# Patient Record
Sex: FEMALE | Race: WHITE | URBAN - METROPOLITAN AREA
[De-identification: names, ages, dates, MRNs, and addresses within clinical notes are randomized per-mention and may not be internally consistent; named-entity substitution may affect disease eponyms.]

---

## 2023-02-06 ENCOUNTER — EMERGENCY (EMERGENCY)
Facility: HOSPITAL | Age: 58
LOS: 0 days | Discharge: HOME | End: 2023-02-06
Attending: EMERGENCY MEDICINE
Payer: COMMERCIAL

## 2023-02-06 VITALS
SYSTOLIC BLOOD PRESSURE: 115 MMHG | HEART RATE: 72 BPM | TEMPERATURE: 98 F | DIASTOLIC BLOOD PRESSURE: 55 MMHG | RESPIRATION RATE: 18 BRPM | OXYGEN SATURATION: 98 %

## 2023-02-06 VITALS
TEMPERATURE: 99 F | SYSTOLIC BLOOD PRESSURE: 96 MMHG | OXYGEN SATURATION: 100 % | HEART RATE: 96 BPM | DIASTOLIC BLOOD PRESSURE: 83 MMHG | RESPIRATION RATE: 16 BRPM

## 2023-02-06 DIAGNOSIS — E78.5 HYPERLIPIDEMIA, UNSPECIFIED: ICD-10-CM

## 2023-02-06 DIAGNOSIS — R29.703 NIHSS SCORE 3: ICD-10-CM

## 2023-02-06 DIAGNOSIS — I10 ESSENTIAL (PRIMARY) HYPERTENSION: ICD-10-CM

## 2023-02-06 DIAGNOSIS — R07.89 OTHER CHEST PAIN: ICD-10-CM

## 2023-02-06 DIAGNOSIS — R20.2 PARESTHESIA OF SKIN: ICD-10-CM

## 2023-02-06 LAB
ALBUMIN SERPL ELPH-MCNC: 3.6 G/DL — SIGNIFICANT CHANGE UP (ref 3.3–5)
ALP SERPL-CCNC: 96 U/L — SIGNIFICANT CHANGE UP (ref 40–120)
ALT FLD-CCNC: 24 U/L — SIGNIFICANT CHANGE UP (ref 12–78)
ANION GAP SERPL CALC-SCNC: 4 MMOL/L — LOW (ref 5–17)
APTT BLD: 26.9 SEC — LOW (ref 27.5–35.5)
AST SERPL-CCNC: 18 U/L — SIGNIFICANT CHANGE UP (ref 15–37)
BASOPHILS # BLD AUTO: 0.05 K/UL — SIGNIFICANT CHANGE UP (ref 0–0.2)
BASOPHILS NFR BLD AUTO: 0.5 % — SIGNIFICANT CHANGE UP (ref 0–2)
BILIRUB SERPL-MCNC: 0.1 MG/DL — LOW (ref 0.2–1.2)
BUN SERPL-MCNC: 17 MG/DL — SIGNIFICANT CHANGE UP (ref 7–23)
CALCIUM SERPL-MCNC: 9.2 MG/DL — SIGNIFICANT CHANGE UP (ref 8.5–10.1)
CHLORIDE SERPL-SCNC: 103 MMOL/L — SIGNIFICANT CHANGE UP (ref 96–108)
CO2 SERPL-SCNC: 33 MMOL/L — HIGH (ref 22–31)
CREAT SERPL-MCNC: 0.69 MG/DL — SIGNIFICANT CHANGE UP (ref 0.5–1.3)
D DIMER BLD IA.RAPID-MCNC: <150 NG/ML DDU — SIGNIFICANT CHANGE UP
EGFR: 102 ML/MIN/1.73M2 — SIGNIFICANT CHANGE UP
EOSINOPHIL # BLD AUTO: 0.19 K/UL — SIGNIFICANT CHANGE UP (ref 0–0.5)
EOSINOPHIL NFR BLD AUTO: 1.8 % — SIGNIFICANT CHANGE UP (ref 0–6)
GLUCOSE SERPL-MCNC: 109 MG/DL — HIGH (ref 70–99)
HCT VFR BLD CALC: 40.6 % — SIGNIFICANT CHANGE UP (ref 34.5–45)
HGB BLD-MCNC: 13.5 G/DL — SIGNIFICANT CHANGE UP (ref 11.5–15.5)
IMM GRANULOCYTES NFR BLD AUTO: 0.3 % — SIGNIFICANT CHANGE UP (ref 0–0.9)
INR BLD: 0.91 RATIO — SIGNIFICANT CHANGE UP (ref 0.88–1.16)
LYMPHOCYTES # BLD AUTO: 2.96 K/UL — SIGNIFICANT CHANGE UP (ref 1–3.3)
LYMPHOCYTES # BLD AUTO: 28 % — SIGNIFICANT CHANGE UP (ref 13–44)
MCHC RBC-ENTMCNC: 30.3 PG — SIGNIFICANT CHANGE UP (ref 27–34)
MCHC RBC-ENTMCNC: 33.3 GM/DL — SIGNIFICANT CHANGE UP (ref 32–36)
MCV RBC AUTO: 91.2 FL — SIGNIFICANT CHANGE UP (ref 80–100)
MONOCYTES # BLD AUTO: 0.71 K/UL — SIGNIFICANT CHANGE UP (ref 0–0.9)
MONOCYTES NFR BLD AUTO: 6.7 % — SIGNIFICANT CHANGE UP (ref 2–14)
NEUTROPHILS # BLD AUTO: 6.63 K/UL — SIGNIFICANT CHANGE UP (ref 1.8–7.4)
NEUTROPHILS NFR BLD AUTO: 62.7 % — SIGNIFICANT CHANGE UP (ref 43–77)
PLATELET # BLD AUTO: 212 K/UL — SIGNIFICANT CHANGE UP (ref 150–400)
POTASSIUM SERPL-MCNC: 3.8 MMOL/L — SIGNIFICANT CHANGE UP (ref 3.5–5.3)
POTASSIUM SERPL-SCNC: 3.8 MMOL/L — SIGNIFICANT CHANGE UP (ref 3.5–5.3)
PROT SERPL-MCNC: 7.1 GM/DL — SIGNIFICANT CHANGE UP (ref 6–8.3)
PROTHROM AB SERPL-ACNC: 10.6 SEC — SIGNIFICANT CHANGE UP (ref 10.5–13.4)
RBC # BLD: 4.45 M/UL — SIGNIFICANT CHANGE UP (ref 3.8–5.2)
RBC # FLD: 13.2 % — SIGNIFICANT CHANGE UP (ref 10.3–14.5)
SODIUM SERPL-SCNC: 140 MMOL/L — SIGNIFICANT CHANGE UP (ref 135–145)
TROPONIN I, HIGH SENSITIVITY RESULT: 3.02 NG/L — SIGNIFICANT CHANGE UP
TROPONIN I, HIGH SENSITIVITY RESULT: 3.13 NG/L — SIGNIFICANT CHANGE UP
WBC # BLD: 10.57 K/UL — HIGH (ref 3.8–10.5)
WBC # FLD AUTO: 10.57 K/UL — HIGH (ref 3.8–10.5)

## 2023-02-06 PROCEDURE — 85379 FIBRIN DEGRADATION QUANT: CPT

## 2023-02-06 PROCEDURE — 71046 X-RAY EXAM CHEST 2 VIEWS: CPT

## 2023-02-06 PROCEDURE — 99285 EMERGENCY DEPT VISIT HI MDM: CPT | Mod: 25

## 2023-02-06 PROCEDURE — 85730 THROMBOPLASTIN TIME PARTIAL: CPT

## 2023-02-06 PROCEDURE — 36415 COLL VENOUS BLD VENIPUNCTURE: CPT

## 2023-02-06 PROCEDURE — 85610 PROTHROMBIN TIME: CPT

## 2023-02-06 PROCEDURE — 84484 ASSAY OF TROPONIN QUANT: CPT

## 2023-02-06 PROCEDURE — 71046 X-RAY EXAM CHEST 2 VIEWS: CPT | Mod: 26

## 2023-02-06 PROCEDURE — 93005 ELECTROCARDIOGRAM TRACING: CPT

## 2023-02-06 PROCEDURE — 85025 COMPLETE CBC W/AUTO DIFF WBC: CPT

## 2023-02-06 PROCEDURE — 80053 COMPREHEN METABOLIC PANEL: CPT

## 2023-02-06 PROCEDURE — 99285 EMERGENCY DEPT VISIT HI MDM: CPT

## 2023-02-06 PROCEDURE — 93010 ELECTROCARDIOGRAM REPORT: CPT

## 2023-02-06 NOTE — ED ADULT TRIAGE NOTE - CHIEF COMPLAINT QUOTE
Pt arrives to ED complaining of one week of intermittent chest pain worsening today with pain to left shoulder. Hx htn

## 2023-02-06 NOTE — ED STATDOCS - PATIENT PORTAL LINK FT
You can access the FollowMyHealth Patient Portal offered by [facility] by registering at the following website: [email or web address]. By joining [employer]’s FollowMyHealth portal, you will also be able to view your health information using other applications (apps) compatible with our system.

## 2023-02-06 NOTE — ED STATDOCS - NEUROLOGICAL, MLM
INDICATION: Psychiatric disorder.



Upright portable AP view of the chest is obtained with comparison

made to study of 04/06/2017.



FINDINGS: Heart size and pulmonary vascularity are within normal

limits, and the lungs are clear, bilaterally.  



IMPRESSION: Unremarkable chest. 



Dictated by: 



  Dictated on workstation # [id number]
sensation is normal and strength is normal.

## 2023-02-06 NOTE — ED STATDOCS - NSFOLLOWUPINSTRUCTIONS_ED_ALL_ED_FT
FOLLOW UP WITH A CARDIOLOGIST THIS WEEK. CALL THE OFFICE TO MAKE AN APPOINTMENT. RETURN TO ER FOR ANY WORSENING SYMPTOMS OR NEW CONCERNS.     Chest Pain    WHAT YOU NEED TO KNOW:    Chest pain can be caused by a range of conditions, from not serious to life-threatening. Chest pain can be a symptom of a digestive problem, such as acid reflux or a stomach ulcer. An anxiety attack or a strong emotion, such as anger, can also cause chest pain. Infection, inflammation, or a fracture in the bones or cartilage in your chest can cause pain or discomfort. Sometimes chest pain or pressure is caused by poor blood flow to your heart (angina). Chest pain may also be caused by life-threatening conditions such as a heart attack or blood clot in your lungs.     DISCHARGE INSTRUCTIONS:    Call 911 if:     You have any of the following signs of a heart attack:   Squeezing, pressure, or pain in your chest       and any of the following:   Discomfort or pain in your back, neck, jaw, stomach, or arm       Shortness of breath      Nausea or vomiting      Lightheadedness or a sudden cold sweat        Return to the emergency department if:     You have chest discomfort that gets worse, even with medicine.      You cough or vomit blood.       Your bowel movements are black or bloody.       You cannot stop vomiting, or it hurts to swallow.     Contact your healthcare provider if:     You have questions or concerns about your condition or care.        Medicines:     Medicines may be given to treat the cause of your chest pain. Examples include pain medicine, anxiety medicine, or medicines to increase blood flow to your heart.       Do not take certain medicines without asking your healthcare provider first. These include NSAIDs, herbal or vitamin supplements, or hormones (estrogen or progestin).       Take your medicine as directed. Contact your healthcare provider if you think your medicine is not helping or if you have side effects. Tell him or her if you are allergic to any medicine. Keep a list of the medicines, vitamins, and herbs you take. Include the amounts, and when and why you take them. Bring the list or the pill bottles to follow-up visits. Carry your medicine list with you in case of an emergency.    Follow up with your healthcare provider within 72 hours, or as directed: You may need to return for more tests to find the cause of your chest pain. You may be referred to a specialist, such as a cardiologist or gastroenterologist. Write down your questions so you remember to ask them during your visits.     Healthy living tips: The following are general healthy guidelines. If your chest pain is caused by a heart problem, your healthcare provider will give you specific guidelines to follow.    Do not smoke. Nicotine and other chemicals in cigarettes and cigars can cause lung and heart damage. Ask your healthcare provider for information if you currently smoke and need help to quit. E-cigarettes or smokeless tobacco still contain nicotine. Talk to your healthcare provider before you use these products.       Eat a variety of healthy, low-fat, low-salt foods. Healthy foods include fruits, vegetables, whole-grain breads, low-fat dairy products, beans, lean meats, and fish. Ask for more information about a heart healthy diet.      Drink plenty of water every day. Your body is made of mostly water. Water helps your body to control your temperature and blood pressure. Ask your healthcare provider how much water you should drink every day.      Ask about activity. Your healthcare provider will tell you which activities to limit or avoid. Ask when you can drive, return to work, and have sex. Ask about the best exercise plan for you.      Maintain a healthy weight. Ask your healthcare provider how much you should weigh. Ask him or her to help you create a weight loss plan if you are overweight.       Get the flu and pneumonia vaccines. All adults should get the influenza (flu) vaccine. Get it every year as soon as it becomes available. The pneumococcal vaccine is given to adults aged 65 years or older. The vaccine is given every 5 years to prevent pneumococcal disease, such as pneumonia.    If you have a stent:     Carry your stent card with you at all times.       Let all healthcare providers know that you have a stent.

## 2023-02-06 NOTE — ED STATDOCS - OBJECTIVE STATEMENT
57 year old female with PMHx of HTN presents to the ED c/o intermittent chest pain for x1 week. States it has worsened today within the past hour. On Hydrochlorothiazide, water pill and cholesterol medication. No allergies to medications. PCP: Lucinda Parish, no known cardiologist.

## 2023-02-06 NOTE — ED ADULT NURSE NOTE - NSIMPLEMENTINTERV_GEN_ALL_ED
Size Of Lesion: 5 mm Morphology Per Location (Optional): Pigmented macule examined with derm scope Detail Level: Detailed Size Of Lesion: 4 mm Implemented All Universal Safety Interventions:  [place] to call system. Call bell, personal items and telephone within reach. Instruct patient to call for assistance. Room bathroom lighting operational. Non-slip footwear when patient is off stretcher. Physically safe environment: no spills, clutter or unnecessary equipment. Stretcher in lowest position, wheels locked, appropriate side rails in place.

## 2023-02-06 NOTE — ED STATDOCS - CLINICAL SUMMARY MEDICAL DECISION MAKING FREE TEXT BOX
signed Deisi Capps PA-C Pt seen initially in intake by Dr Haddad.   57F with intermittent chest pain x 1 week, worsened today, pt also had some bilateral hand tingling. PMH HTN, HLD. signed Deisi Capps PA-C Pt seen initially in intake by Dr Haddad.   57F with intermittent chest pain x 1 week, worsened today, pt also had some bilateral hand tingling. No significant findings on labs, imaging, or EKG.  PMH HTN, HLD. heart score 3. outpt f/u with card. return precautions given. Pt feeling well, pt and family agree with DC and plan of care.

## 2023-02-06 NOTE — ED ADULT NURSE NOTE - OBJECTIVE STATEMENT
57y female patient presented to the ED complaining of chest pain for the last 6 days, but has gotten worse in the last hour. Pt states at the time she had numbness and tingling in her hands and arms. Pt denies SOB, N/V/D. Pt states she is in no pain or discomfort at this time. Pt states she quit smoking 1 month ago.

## 2023-02-06 NOTE — ED STATDOCS - CARE PROVIDER_API CALL
REECE CHARLTON A  Internal Medicine  [address] [place] [address]  Phone: [phone]  Fax: [phone]  Follow Up Time:

## 2023-02-06 NOTE — ED STATDOCS - EYES, MLM
clear bilaterally.  Pupils equal, round, and reactive to light. [Negative] : Heme/Lymph [As Noted in HPI] : as noted in HPI [Arthralgias] : arthralgias [Joint Pain] : joint pain [Joint Swelling] : no joint swelling [Joint Stiffness] : joint stiffness

## 2024-09-19 ENCOUNTER — EMERGENCY (EMERGENCY)
Facility: HOSPITAL | Age: 59
LOS: 1 days | Discharge: HOME | End: 2024-09-19
Attending: STUDENT IN AN ORGANIZED HEALTH CARE EDUCATION/TRAINING PROGRAM
Payer: COMMERCIAL

## 2024-09-19 VITALS
TEMPERATURE: 98 F | DIASTOLIC BLOOD PRESSURE: 89 MMHG | RESPIRATION RATE: 18 BRPM | WEIGHT: 169.98 LBS | SYSTOLIC BLOOD PRESSURE: 137 MMHG | OXYGEN SATURATION: 98 % | HEART RATE: 100 BPM

## 2024-09-19 LAB
ALBUMIN SERPL ELPH-MCNC: 3.8 G/DL — SIGNIFICANT CHANGE UP (ref 3.3–5.2)
ALP SERPL-CCNC: 83 U/L — SIGNIFICANT CHANGE UP (ref 40–120)
ALT FLD-CCNC: 20 U/L — SIGNIFICANT CHANGE UP
ANION GAP SERPL CALC-SCNC: 11 MMOL/L — SIGNIFICANT CHANGE UP (ref 5–17)
AST SERPL-CCNC: 20 U/L — SIGNIFICANT CHANGE UP
BASOPHILS # BLD AUTO: 0.06 K/UL — SIGNIFICANT CHANGE UP (ref 0–0.2)
BASOPHILS NFR BLD AUTO: 0.5 % — SIGNIFICANT CHANGE UP (ref 0–2)
BILIRUB SERPL-MCNC: 0.2 MG/DL — LOW (ref 0.4–2)
BUN SERPL-MCNC: 13.4 MG/DL — SIGNIFICANT CHANGE UP (ref 8–20)
CALCIUM SERPL-MCNC: 9.3 MG/DL — SIGNIFICANT CHANGE UP (ref 8.4–10.5)
CHLORIDE SERPL-SCNC: 101 MMOL/L — SIGNIFICANT CHANGE UP (ref 96–108)
CO2 SERPL-SCNC: 27 MMOL/L — SIGNIFICANT CHANGE UP (ref 22–29)
CREAT SERPL-MCNC: 0.61 MG/DL — SIGNIFICANT CHANGE UP (ref 0.5–1.3)
EGFR: 104 ML/MIN/1.73M2 — SIGNIFICANT CHANGE UP
EOSINOPHIL # BLD AUTO: 0.16 K/UL — SIGNIFICANT CHANGE UP (ref 0–0.5)
EOSINOPHIL NFR BLD AUTO: 1.2 % — SIGNIFICANT CHANGE UP (ref 0–6)
GLUCOSE SERPL-MCNC: 98 MG/DL — SIGNIFICANT CHANGE UP (ref 70–99)
HCT VFR BLD CALC: 41.2 % — SIGNIFICANT CHANGE UP (ref 34.5–45)
HGB BLD-MCNC: 13.8 G/DL — SIGNIFICANT CHANGE UP (ref 11.5–15.5)
IMM GRANULOCYTES NFR BLD AUTO: 0.2 % — SIGNIFICANT CHANGE UP (ref 0–0.9)
LYMPHOCYTES # BLD AUTO: 28 % — SIGNIFICANT CHANGE UP (ref 13–44)
LYMPHOCYTES # BLD AUTO: 3.7 K/UL — HIGH (ref 1–3.3)
MCHC RBC-ENTMCNC: 30.5 PG — SIGNIFICANT CHANGE UP (ref 27–34)
MCHC RBC-ENTMCNC: 33.5 GM/DL — SIGNIFICANT CHANGE UP (ref 32–36)
MCV RBC AUTO: 91.2 FL — SIGNIFICANT CHANGE UP (ref 80–100)
MONOCYTES # BLD AUTO: 0.76 K/UL — SIGNIFICANT CHANGE UP (ref 0–0.9)
MONOCYTES NFR BLD AUTO: 5.8 % — SIGNIFICANT CHANGE UP (ref 2–14)
NEUTROPHILS # BLD AUTO: 8.49 K/UL — HIGH (ref 1.8–7.4)
NEUTROPHILS NFR BLD AUTO: 64.3 % — SIGNIFICANT CHANGE UP (ref 43–77)
PLATELET # BLD AUTO: 238 K/UL — SIGNIFICANT CHANGE UP (ref 150–400)
POTASSIUM SERPL-MCNC: 3.3 MMOL/L — LOW (ref 3.5–5.3)
POTASSIUM SERPL-SCNC: 3.3 MMOL/L — LOW (ref 3.5–5.3)
PROT SERPL-MCNC: 6.7 G/DL — SIGNIFICANT CHANGE UP (ref 6.6–8.7)
RBC # BLD: 4.52 M/UL — SIGNIFICANT CHANGE UP (ref 3.8–5.2)
RBC # FLD: 12.5 % — SIGNIFICANT CHANGE UP (ref 10.3–14.5)
SODIUM SERPL-SCNC: 139 MMOL/L — SIGNIFICANT CHANGE UP (ref 135–145)
WBC # BLD: 13.2 K/UL — HIGH (ref 3.8–10.5)
WBC # FLD AUTO: 13.2 K/UL — HIGH (ref 3.8–10.5)

## 2024-09-19 PROCEDURE — 70450 CT HEAD/BRAIN W/O DYE: CPT | Mod: 26,MC

## 2024-09-19 PROCEDURE — 72170 X-RAY EXAM OF PELVIS: CPT | Mod: 26

## 2024-09-19 PROCEDURE — 99284 EMERGENCY DEPT VISIT MOD MDM: CPT

## 2024-09-19 PROCEDURE — 71045 X-RAY EXAM CHEST 1 VIEW: CPT | Mod: 26

## 2024-09-19 PROCEDURE — 72128 CT CHEST SPINE W/O DYE: CPT | Mod: 26,MC

## 2024-09-19 PROCEDURE — 72125 CT NECK SPINE W/O DYE: CPT | Mod: 26,MC

## 2024-09-19 PROCEDURE — 99282 EMERGENCY DEPT VISIT SF MDM: CPT | Mod: 1L

## 2024-09-19 RX ORDER — METHOCARBAMOL 750 MG/1
1500 TABLET, FILM COATED ORAL ONCE
Refills: 0 | Status: COMPLETED | OUTPATIENT
Start: 2024-09-19 | End: 2024-09-19

## 2024-09-19 RX ORDER — IBUPROFEN 600 MG
600 TABLET ORAL ONCE
Refills: 0 | Status: COMPLETED | OUTPATIENT
Start: 2024-09-19 | End: 2024-09-19

## 2024-09-19 RX ADMIN — METHOCARBAMOL 1500 MILLIGRAM(S): 750 TABLET, FILM COATED ORAL at 18:08

## 2024-09-19 RX ADMIN — Medication 600 MILLIGRAM(S): at 18:08
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
## 2024-09-19 NOTE — ED PROVIDER NOTE - CLINICAL SUMMARY MEDICAL DECISION MAKING FREE TEXT BOX
Patient was seen and examined. Lab work was unremarkable. CT head/cervical spine showed osseous defect along the right lateral margin of the C1 posterior arch,   most likely a chronic fracture or congenital variant. MRI cervical spine showed....Neurosrugery was consulted and their evaluation showed outpatient follow-up pending MRI read. Patient was seen and examined. Lab work was unremarkable. CT head/cervical spine showed osseous defect along the right lateral margin of the C1 posterior arch, most likely a chronic fracture or congenital variant. MRI cervical spine showed....Neurosrugery was consulted and their evaluation showed outpatient follow-up pending MRI read. Patient was seen and examined. Lab work was unremarkable. CT head/cervical spine showed osseous defect along the right lateral margin of the C1 posterior arch, most likely a chronic fracture or congenital variant. MRI cervical spine unremarkable. Neurosurgery was consulted and their evaluation showed outpatient follow-up. Patient will be discharged.

## 2024-09-19 NOTE — ED PROVIDER NOTE - PHYSICAL EXAMINATION
Gen: NAD, AOx3  Head: NCAT  HEENT: EOMI, oral mucosa moist, normal conjunctiva, neck supple  Lung: CTAB, no respiratory distress  CV: rrr, no murmur, Normal perfusion  Abd: soft, NTND  MSK: paraspinal and midline tenderness in cervical, lower thoracic and lumbar areas of back, no edema, no visible deformities  Neuro: L UE 5/5 motor strength, mild deficit noted in R UE strength when testing abduction and adduction, as well as lateral raise of shoulder, LE exam 5/5 strength b/l, No focal neurologic deficits  Skin: No rash   Psych: normal affect Gen: NAD, AOx3  Head: NCAT  HEENT: EOMI, oral mucosa moist, normal conjunctiva, neck supple  Lung: CTAB, no respiratory distress  CV: rrr, no murmur, Normal perfusion  Abd: soft, NTND  MSK: paraspinal and midline tenderness in cervical, lower thoracic and lumbar areas of back, no edema, no visible deformities  Neuro: L UE 5/5 motor strength, mild deficit noted in R UE strength when testing abduction and adduction, as well as lateral raise of shoulder, LE exam 5/5 strength b/l  Skin: No rash   Psych: normal affect Gen: NAD, AOx3  Head: NCAT  HEENT: EOMI, oral mucosa moist, normal conjunctiva, neck supple  Lung: CTAB, no respiratory distress  CV: rrr, no murmur, Normal perfusion  Abd: soft, NTND  MSK: paraspinal and midline tenderness in cervical, upper thoracic areas of back, no edema, no visible deformities  Neuro: L UE 5/5 motor strength, mild deficit noted in R UE strength when testing abduction and adduction, as well as lateral raise of shoulder, LE exam 5/5 strength b/l  Skin: No rash   Psych: normal affect

## 2024-09-19 NOTE — ED PROVIDER NOTE - PATIENT PORTAL LINK FT
You can access the FollowMyHealth Patient Portal offered by [facility] by registering at the following website: [email or web address]. By joining [employer]’s FollowMyHealth portal, you will also be able to view your health information using other applications (apps) compatible with our system.

## 2024-09-19 NOTE — ED PROVIDER NOTE - CARE PROVIDERS DIRECT ADDRESSES
[email or web address]
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
## 2024-09-19 NOTE — ED ADULT NURSE NOTE - NSSUHOSCREENINGYN_ED_ALL_ED
Attempted to see patient for diabetes education consult. Per [facility] staff pt has already been discharged. Will send follow up letter and attempt outpatient follow up.   Yes - the patient is able to be screened

## 2024-09-19 NOTE — ED PROVIDER NOTE - CARE PROVIDER_API CALL
Brock Petersen  Neurosurgery  [address] [place] [id number]  Phone: [phone]  Fax: [phone]  Follow Up Time:
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
## 2024-09-20 VITALS
HEART RATE: 97 BPM | DIASTOLIC BLOOD PRESSURE: 78 MMHG | SYSTOLIC BLOOD PRESSURE: 123 MMHG | RESPIRATION RATE: 16 BRPM | TEMPERATURE: 99 F | OXYGEN SATURATION: 95 %

## 2024-09-20 PROBLEM — I10 ESSENTIAL (PRIMARY) HYPERTENSION: Chronic | Status: ACTIVE | Noted: 2023-02-07

## 2024-09-20 PROCEDURE — 99232 SBSQ HOSP IP/OBS MODERATE 35: CPT

## 2024-09-20 PROCEDURE — 36415 COLL VENOUS BLD VENIPUNCTURE: CPT

## 2024-09-20 PROCEDURE — 72141 MRI NECK SPINE W/O DYE: CPT | Mod: 26,MC

## 2024-09-20 PROCEDURE — 99284 EMERGENCY DEPT VISIT MOD MDM: CPT | Mod: 25

## 2024-09-20 PROCEDURE — 71045 X-RAY EXAM CHEST 1 VIEW: CPT

## 2024-09-20 PROCEDURE — 72125 CT NECK SPINE W/O DYE: CPT | Mod: MC

## 2024-09-20 PROCEDURE — 72141 MRI NECK SPINE W/O DYE: CPT | Mod: MC

## 2024-09-20 PROCEDURE — 72170 X-RAY EXAM OF PELVIS: CPT

## 2024-09-20 PROCEDURE — 80053 COMPREHEN METABOLIC PANEL: CPT

## 2024-09-20 PROCEDURE — 70450 CT HEAD/BRAIN W/O DYE: CPT | Mod: MC

## 2024-09-20 PROCEDURE — 85025 COMPLETE CBC W/AUTO DIFF WBC: CPT

## 2024-09-20 PROCEDURE — 72128 CT CHEST SPINE W/O DYE: CPT | Mod: MC

## 2024-09-20 PROCEDURE — 96374 THER/PROPH/DIAG INJ IV PUSH: CPT

## 2024-09-20 RX ORDER — KETOROLAC TROMETHAMINE 30 MG/ML
15 INJECTION, SOLUTION INTRAMUSCULAR ONCE
Refills: 0 | Status: DISCONTINUED | OUTPATIENT
Start: 2024-09-20 | End: 2024-09-20

## 2024-09-20 RX ORDER — POTASSIUM CHLORIDE 10 MEQ
40 TABLET, EXT RELEASE, PARTICLES/CRYSTALS ORAL ONCE
Refills: 0 | Status: COMPLETED | OUTPATIENT
Start: 2024-09-20 | End: 2024-09-20

## 2024-09-20 RX ADMIN — Medication 40 MILLIEQUIVALENT(S): at 02:50

## 2024-09-20 RX ADMIN — Medication 600 MILLIGRAM(S): at 07:20

## 2024-09-20 RX ADMIN — KETOROLAC TROMETHAMINE 15 MILLIGRAM(S): 30 INJECTION, SOLUTION INTRAMUSCULAR at 07:20

## 2024-09-20 RX ADMIN — KETOROLAC TROMETHAMINE 15 MILLIGRAM(S): 30 INJECTION, SOLUTION INTRAMUSCULAR at 04:34
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
## 2024-09-20 NOTE — ED ADULT NURSE REASSESSMENT NOTE - NSFALLUNIVINTERV_ED_ALL_ED
Bed/Stretcher in lowest position, wheels locked, appropriate side rails in place/Call bell, personal items and telephone in reach/Instruct patient to call for assistance before getting out of bed/chair/stretcher/Non-slip footwear applied when patient is off stretcher/[place] to call system/Physically safe environment - no spills, clutter or unnecessary equipment/Purposeful proactive rounding/Room/bathroom lighting operational, light cord in reach
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
## 2024-09-20 NOTE — ED ADULT NURSE REASSESSMENT NOTE - NSFALLRISK_ED_ALL_ED
Patient is a 73y old  Male who presents with a chief complaint of carotis stenosis (27 Feb 2020 08:33)      INTERVAL HPI/OVERNIGHT EVENTS: feels well, all dressed for discharge  denies dizziness      Vital Signs Last 24 Hrs  T(C): 36.8 (28 Feb 2020 09:04), Max: 37.1 (27 Feb 2020 21:00)  T(F): 98.3 (28 Feb 2020 09:04), Max: 98.8 (27 Feb 2020 21:00)  HR: 67 (28 Feb 2020 06:21) (65 - 67)  BP: 168/78 (28 Feb 2020 06:21) (150/70 - 168/78)  BP(mean): --  RR: 18 (28 Feb 2020 09:04) (16 - 18)  SpO2: 96% (28 Feb 2020 09:04) (95% - 96%)    acetaminophen   Tablet .. 975 milliGRAM(s) Oral every 6 hours PRN  amLODIPine   Tablet 10 milliGRAM(s) Oral daily  aspirin enteric coated 81 milliGRAM(s) Oral daily  atorvastatin 80 milliGRAM(s) Oral at bedtime  enoxaparin Injectable 40 milliGRAM(s) SubCutaneous daily  hydrALAZINE 25 milliGRAM(s) Oral daily  metoprolol succinate  milliGRAM(s) Oral daily  oxyCODONE    IR 5 milliGRAM(s) Oral every 6 hours PRN      PHYSICAL EXAM:  GENERAL: NAD,   EYES: conjunctiva and sclera clear  ENMT: Moist mucous membranes  NECK: Supple, No JVD, Normal thyroid  NERVOUS SYSTEM:  Alert & Oriented X,   CHEST/LUNG: Clear to auscultation bilaterally; No rales, rhonchi, wheezing, or rubs  HEART: Regular rate and rhythm; No murmurs, rubs, or gallops  ABDOMEN: Soft, Nontender, Nondistended; Bowel sounds present  EXTREMITIES:  2+ Peripheral Pulses, No clubbing, cyanosis, or edema  LYMPH: No lymphadenopathy noted  SKIN: No rashes or lesions    Consultant(s) Notes Reviewed:  [x ] YES  [ ] NO  Care Discussed with Consultants/Other Providers [ x] YES  [ ] NO    LABS:                        11.4   14.16 )-----------( 190      ( 27 Feb 2020 07:17 )             36.2     02-27    137  |  101  |  17  ----------------------------<  179<H>  4.5   |  24  |  1.15    Ca    8.6      27 Feb 2020 07:13  Phos  2.9     02-27  Mg     2.0     02-27          CAPILLARY BLOOD GLUCOSE                  RADIOLOGY & ADDITIONAL TESTS:    Imaging Personally Reviewed:  [x ] YES  [ ] NO No
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
## 2024-09-23 DIAGNOSIS — R20.0 ANESTHESIA OF SKIN: ICD-10-CM

## 2024-09-23 DIAGNOSIS — E78.5 HYPERLIPIDEMIA, UNSPECIFIED: ICD-10-CM

## 2024-09-23 DIAGNOSIS — I10 ESSENTIAL (PRIMARY) HYPERTENSION: ICD-10-CM
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
## 2024-09-25 ENCOUNTER — NON-APPOINTMENT (OUTPATIENT)
Age: 59
End: 2024-09-25

## 2024-09-26 ENCOUNTER — APPOINTMENT (OUTPATIENT)
Dept: NEUROSURGERY | Facility: CLINIC | Age: 59
End: 2024-09-26
Payer: COMMERCIAL

## 2024-09-26 VITALS
BODY MASS INDEX: 29.02 KG/M2 | TEMPERATURE: 98.2 F | DIASTOLIC BLOOD PRESSURE: 76 MMHG | HEIGHT: 64 IN | WEIGHT: 170 LBS | OXYGEN SATURATION: 98 % | SYSTOLIC BLOOD PRESSURE: 115 MMHG | HEART RATE: 78 BPM

## 2024-09-26 DIAGNOSIS — Z78.9 OTHER SPECIFIED HEALTH STATUS: ICD-10-CM

## 2024-09-26 DIAGNOSIS — M47.812 SPONDYLOSIS W/OUT MYELOPATHY OR RADICULOPATHY, CERVICAL REGION: ICD-10-CM

## 2024-09-26 DIAGNOSIS — R51.9 HEADACHE, UNSPECIFIED: ICD-10-CM

## 2024-09-26 DIAGNOSIS — G89.29 HEADACHE, UNSPECIFIED: ICD-10-CM

## 2024-09-26 DIAGNOSIS — Z86.79 PERSONAL HISTORY OF OTHER DISEASES OF THE CIRCULATORY SYSTEM: ICD-10-CM

## 2024-09-26 PROCEDURE — 99213 OFFICE O/P EST LOW 20 MIN: CPT
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
## 2024-09-27 NOTE — DATA REVIEWED
[de-identified] : CT scans [facility] 9/19/24:  CT BRAIN: No acute intracranial hemorrhage, mass effect, or midline shift.  0.8 cm hyperdensity in the white matter abutting the right lateral ventricle, favoring a cavernous malformation.  CT CERVICAL SPINE: Osseous defect along the right lateral margin of the C1 posterior arch, most likely a chronic fracture or congenital variant. Nevertheless, given patient's symptomatology, further workup with MRI of the cervical spine is suggested.  CT THORACIC SPINE: No acute fracture or traumatic subluxation.   [de-identified] : [facility]  IMPRESSION:  Cervical spondylosis as described.  Chronic posterior right C1 arch fracture. No findings to suggest acuity.

## 2024-09-27 NOTE — DATA REVIEWED
[de-identified] : CT scans [facility] 9/19/24:  CT BRAIN: No acute intracranial hemorrhage, mass effect, or midline shift.  0.8 cm hyperdensity in the white matter abutting the right lateral ventricle, favoring a cavernous malformation.  CT CERVICAL SPINE: Osseous defect along the right lateral margin of the C1 posterior arch, most likely a chronic fracture or congenital variant. Nevertheless, given patient's symptomatology, further workup with MRI of the cervical spine is suggested.  CT THORACIC SPINE: No acute fracture or traumatic subluxation.   [de-identified] : [facility]  IMPRESSION:  Cervical spondylosis as described.  Chronic posterior right C1 arch fracture. No findings to suggest acuity.

## 2024-10-09 ENCOUNTER — OUTPATIENT (OUTPATIENT)
Dept: OUTPATIENT SERVICES | Facility: HOSPITAL | Age: 59
LOS: 1 days | End: 2024-10-09
Payer: COMMERCIAL

## 2024-10-09 ENCOUNTER — APPOINTMENT (OUTPATIENT)
Dept: MRI IMAGING | Facility: CLINIC | Age: 59
End: 2024-10-09

## 2024-10-09 DIAGNOSIS — R51.9 HEADACHE, UNSPECIFIED: ICD-10-CM

## 2024-10-09 PROCEDURE — A9585: CPT

## 2024-10-09 PROCEDURE — 70553 MRI BRAIN STEM W/O & W/DYE: CPT

## 2024-10-09 PROCEDURE — 70553 MRI BRAIN STEM W/O & W/DYE: CPT | Mod: 26

## 2024-10-14 ENCOUNTER — NON-APPOINTMENT (OUTPATIENT)
Age: 59
End: 2024-10-14

## 2024-10-28 ENCOUNTER — INPATIENT (INPATIENT)
Facility: HOSPITAL | Age: 59
LOS: 0 days | Discharge: HOME | DRG: 287 | End: 2024-10-29
Attending: INTERNAL MEDICINE | Admitting: INTERNAL MEDICINE
Payer: COMMERCIAL

## 2024-10-28 VITALS
WEIGHT: 173.28 LBS | RESPIRATION RATE: 18 BRPM | TEMPERATURE: 99 F | SYSTOLIC BLOOD PRESSURE: 134 MMHG | OXYGEN SATURATION: 97 % | DIASTOLIC BLOOD PRESSURE: 83 MMHG | HEART RATE: 103 BPM

## 2024-10-28 DIAGNOSIS — Z98.891 HISTORY OF UTERINE SCAR FROM PREVIOUS SURGERY: Chronic | ICD-10-CM

## 2024-10-28 DIAGNOSIS — R07.9 CHEST PAIN, UNSPECIFIED: ICD-10-CM

## 2024-10-28 DIAGNOSIS — Z90.89 ACQUIRED ABSENCE OF OTHER ORGANS: Chronic | ICD-10-CM

## 2024-10-28 LAB
ALBUMIN SERPL ELPH-MCNC: 3.7 G/DL — SIGNIFICANT CHANGE UP (ref 3.3–5)
ALP SERPL-CCNC: 86 U/L — SIGNIFICANT CHANGE UP (ref 40–120)
ALT FLD-CCNC: 34 U/L — SIGNIFICANT CHANGE UP (ref 12–78)
ANION GAP SERPL CALC-SCNC: 4 MMOL/L — LOW (ref 5–17)
APTT BLD: 30.4 SEC — SIGNIFICANT CHANGE UP (ref 24.5–35.6)
AST SERPL-CCNC: 22 U/L — SIGNIFICANT CHANGE UP (ref 15–37)
BASOPHILS # BLD AUTO: 0.03 K/UL — SIGNIFICANT CHANGE UP (ref 0–0.2)
BASOPHILS NFR BLD AUTO: 0.3 % — SIGNIFICANT CHANGE UP (ref 0–2)
BILIRUB SERPL-MCNC: 0.2 MG/DL — SIGNIFICANT CHANGE UP (ref 0.2–1.2)
BUN SERPL-MCNC: 13 MG/DL — SIGNIFICANT CHANGE UP (ref 7–23)
CALCIUM SERPL-MCNC: 9.7 MG/DL — SIGNIFICANT CHANGE UP (ref 8.5–10.1)
CHLORIDE SERPL-SCNC: 106 MMOL/L — SIGNIFICANT CHANGE UP (ref 96–108)
CO2 SERPL-SCNC: 29 MMOL/L — SIGNIFICANT CHANGE UP (ref 22–31)
CREAT SERPL-MCNC: 0.69 MG/DL — SIGNIFICANT CHANGE UP (ref 0.5–1.3)
D DIMER BLD IA.RAPID-MCNC: <150 NG/ML DDU — SIGNIFICANT CHANGE UP
EGFR: 101 ML/MIN/1.73M2 — SIGNIFICANT CHANGE UP
EOSINOPHIL # BLD AUTO: 0.07 K/UL — SIGNIFICANT CHANGE UP (ref 0–0.5)
EOSINOPHIL NFR BLD AUTO: 0.6 % — SIGNIFICANT CHANGE UP (ref 0–6)
GLUCOSE SERPL-MCNC: 109 MG/DL — HIGH (ref 70–99)
HCT VFR BLD CALC: 41.4 % — SIGNIFICANT CHANGE UP (ref 34.5–45)
HGB BLD-MCNC: 13.8 G/DL — SIGNIFICANT CHANGE UP (ref 11.5–15.5)
IMM GRANULOCYTES NFR BLD AUTO: 0.3 % — SIGNIFICANT CHANGE UP (ref 0–0.9)
INR BLD: 0.88 RATIO — SIGNIFICANT CHANGE UP (ref 0.85–1.16)
LYMPHOCYTES # BLD AUTO: 2.68 K/UL — SIGNIFICANT CHANGE UP (ref 1–3.3)
LYMPHOCYTES # BLD AUTO: 23.9 % — SIGNIFICANT CHANGE UP (ref 13–44)
MAGNESIUM SERPL-MCNC: 2.2 MG/DL — SIGNIFICANT CHANGE UP (ref 1.6–2.6)
MCHC RBC-ENTMCNC: 31 PG — SIGNIFICANT CHANGE UP (ref 27–34)
MCHC RBC-ENTMCNC: 33.3 GM/DL — SIGNIFICANT CHANGE UP (ref 32–36)
MCV RBC AUTO: 93 FL — SIGNIFICANT CHANGE UP (ref 80–100)
MONOCYTES # BLD AUTO: 0.63 K/UL — SIGNIFICANT CHANGE UP (ref 0–0.9)
MONOCYTES NFR BLD AUTO: 5.6 % — SIGNIFICANT CHANGE UP (ref 2–14)
NEUTROPHILS # BLD AUTO: 7.78 K/UL — HIGH (ref 1.8–7.4)
NEUTROPHILS NFR BLD AUTO: 69.3 % — SIGNIFICANT CHANGE UP (ref 43–77)
NT-PROBNP SERPL-SCNC: 25 PG/ML — SIGNIFICANT CHANGE UP (ref 0–125)
PLATELET # BLD AUTO: 248 K/UL — SIGNIFICANT CHANGE UP (ref 150–400)
POTASSIUM SERPL-MCNC: 3.9 MMOL/L — SIGNIFICANT CHANGE UP (ref 3.5–5.3)
POTASSIUM SERPL-SCNC: 3.9 MMOL/L — SIGNIFICANT CHANGE UP (ref 3.5–5.3)
PROT SERPL-MCNC: 7.6 GM/DL — SIGNIFICANT CHANGE UP (ref 6–8.3)
PROTHROM AB SERPL-ACNC: 10.1 SEC — SIGNIFICANT CHANGE UP (ref 9.9–13.4)
RBC # BLD: 4.45 M/UL — SIGNIFICANT CHANGE UP (ref 3.8–5.2)
RBC # FLD: 12.7 % — SIGNIFICANT CHANGE UP (ref 10.3–14.5)
SODIUM SERPL-SCNC: 139 MMOL/L — SIGNIFICANT CHANGE UP (ref 135–145)
TROPONIN I, HIGH SENSITIVITY RESULT: 3.17 NG/L — SIGNIFICANT CHANGE UP
TROPONIN I, HIGH SENSITIVITY RESULT: 3.62 NG/L — SIGNIFICANT CHANGE UP
TROPONIN I, HIGH SENSITIVITY RESULT: 4.05 NG/L — SIGNIFICANT CHANGE UP
TSH SERPL-MCNC: 3.63 UU/ML — SIGNIFICANT CHANGE UP (ref 0.34–4.82)
WBC # BLD: 11.22 K/UL — HIGH (ref 3.8–10.5)
WBC # FLD AUTO: 11.22 K/UL — HIGH (ref 3.8–10.5)

## 2024-10-28 PROCEDURE — 93454 CORONARY ARTERY ANGIO S&I: CPT

## 2024-10-28 PROCEDURE — 99222 1ST HOSP IP/OBS MODERATE 55: CPT

## 2024-10-28 PROCEDURE — 80048 BASIC METABOLIC PNL TOTAL CA: CPT

## 2024-10-28 PROCEDURE — 93010 ELECTROCARDIOGRAM REPORT: CPT

## 2024-10-28 PROCEDURE — 80061 LIPID PANEL: CPT

## 2024-10-28 PROCEDURE — C1887: CPT

## 2024-10-28 PROCEDURE — C1894: CPT

## 2024-10-28 PROCEDURE — 85027 COMPLETE CBC AUTOMATED: CPT

## 2024-10-28 PROCEDURE — 84484 ASSAY OF TROPONIN QUANT: CPT

## 2024-10-28 PROCEDURE — 36415 COLL VENOUS BLD VENIPUNCTURE: CPT

## 2024-10-28 PROCEDURE — 71045 X-RAY EXAM CHEST 1 VIEW: CPT | Mod: 26

## 2024-10-28 PROCEDURE — 99285 EMERGENCY DEPT VISIT HI MDM: CPT

## 2024-10-28 PROCEDURE — C1769: CPT

## 2024-10-28 RX ORDER — MAGNESIUM, ALUMINUM HYDROXIDE 200-200 MG
30 TABLET,CHEWABLE ORAL EVERY 4 HOURS
Refills: 0 | Status: DISCONTINUED | OUTPATIENT
Start: 2024-10-28 | End: 2024-10-29

## 2024-10-28 RX ORDER — FAMOTIDINE 10 MG/ML
20 INJECTION INTRAVENOUS ONCE
Refills: 0 | Status: COMPLETED | OUTPATIENT
Start: 2024-10-28 | End: 2024-10-28

## 2024-10-28 RX ORDER — NITROGLYCERIN 0.6MG/HR
0.4 PATCH, TRANSDERMAL 24 HOURS TRANSDERMAL
Refills: 0 | Status: DISCONTINUED | OUTPATIENT
Start: 2024-10-28 | End: 2024-10-29

## 2024-10-28 RX ORDER — MELATONIN 5 MG
3 TABLET ORAL AT BEDTIME
Refills: 0 | Status: DISCONTINUED | OUTPATIENT
Start: 2024-10-28 | End: 2024-10-29

## 2024-10-28 RX ORDER — ACETAMINOPHEN 500 MG
975 TABLET ORAL ONCE
Refills: 0 | Status: DISCONTINUED | OUTPATIENT
Start: 2024-10-28 | End: 2024-10-28

## 2024-10-28 RX ORDER — ROSUVASTATIN CALCIUM 10 MG
5 TABLET ORAL
Refills: 0 | DISCHARGE

## 2024-10-28 RX ORDER — METOCLOPRAMIDE HCL 10 MG
10 TABLET ORAL ONCE
Refills: 0 | Status: COMPLETED | OUTPATIENT
Start: 2024-10-28 | End: 2024-10-28

## 2024-10-28 RX ORDER — LISINOPRIL 40 MG
40 TABLET ORAL DAILY
Refills: 0 | Status: DISCONTINUED | OUTPATIENT
Start: 2024-10-29 | End: 2024-10-29

## 2024-10-28 RX ORDER — ACETAMINOPHEN 500 MG
1000 TABLET ORAL ONCE
Refills: 0 | Status: COMPLETED | OUTPATIENT
Start: 2024-10-28 | End: 2024-10-28

## 2024-10-28 RX ORDER — ROSUVASTATIN CALCIUM 10 MG
5 TABLET ORAL AT BEDTIME
Refills: 0 | Status: DISCONTINUED | OUTPATIENT
Start: 2024-10-28 | End: 2024-10-29

## 2024-10-28 RX ORDER — ACETAMINOPHEN 500 MG
650 TABLET ORAL EVERY 6 HOURS
Refills: 0 | Status: DISCONTINUED | OUTPATIENT
Start: 2024-10-28 | End: 2024-10-28

## 2024-10-28 RX ORDER — ONDANSETRON HYDROCHLORIDE 2 MG/ML
4 INJECTION, SOLUTION INTRAMUSCULAR; INTRAVENOUS EVERY 8 HOURS
Refills: 0 | Status: DISCONTINUED | OUTPATIENT
Start: 2024-10-28 | End: 2024-10-29

## 2024-10-28 RX ORDER — HYDROCHLOROTHIAZIDE 50 MG
1 TABLET ORAL
Refills: 0 | DISCHARGE

## 2024-10-28 RX ORDER — ASPIRIN/MAG CARB/ALUMINUM AMIN 325 MG
324 TABLET ORAL ONCE
Refills: 0 | Status: COMPLETED | OUTPATIENT
Start: 2024-10-28 | End: 2024-10-28

## 2024-10-28 RX ORDER — ASPIRIN/MAG CARB/ALUMINUM AMIN 325 MG
81 TABLET ORAL DAILY
Refills: 0 | Status: DISCONTINUED | OUTPATIENT
Start: 2024-10-29 | End: 2024-10-29

## 2024-10-28 RX ORDER — LISINOPRIL 40 MG
1 TABLET ORAL
Refills: 0 | DISCHARGE

## 2024-10-28 RX ORDER — ACETAMINOPHEN 500 MG
650 TABLET ORAL EVERY 6 HOURS
Refills: 0 | Status: DISCONTINUED | OUTPATIENT
Start: 2024-10-28 | End: 2024-10-29

## 2024-10-28 RX ORDER — MAGNESIUM, ALUMINUM HYDROXIDE 200-200 MG
30 TABLET,CHEWABLE ORAL ONCE
Refills: 0 | Status: COMPLETED | OUTPATIENT
Start: 2024-10-28 | End: 2024-10-28

## 2024-10-28 RX ORDER — INFLUENZ VIR VAC TV P-SURF2003 15MCG/.5ML
0.5 SYRINGE (ML) INTRAMUSCULAR ONCE
Refills: 0 | Status: DISCONTINUED | OUTPATIENT
Start: 2024-10-28 | End: 2024-10-29

## 2024-10-28 RX ADMIN — Medication 0.4 MILLIGRAM(S): at 22:43

## 2024-10-28 RX ADMIN — Medication 400 MILLIGRAM(S): at 16:20

## 2024-10-28 RX ADMIN — Medication 324 MILLIGRAM(S): at 17:10

## 2024-10-28 RX ADMIN — Medication 30 MILLILITER(S): at 17:10

## 2024-10-28 RX ADMIN — FAMOTIDINE 20 MILLIGRAM(S): 10 INJECTION INTRAVENOUS at 17:10

## 2024-10-28 RX ADMIN — Medication 3 MILLIGRAM(S): at 22:43

## 2024-10-28 RX ADMIN — Medication 10 MILLIGRAM(S): at 17:11

## 2024-10-28 RX ADMIN — Medication 1000 MILLIGRAM(S): at 16:50

## 2024-10-28 NOTE — ED STATDOCS - ATTENDING CONTRIBUTION TO CARE
I, Nelda Soares DO,  performed the initial face to face bedside interview with this patient regarding history of present illness, review of symptoms and relevant past medical, social and family history.  I completed an independent physical examination.  I was the initial provider who evaluated this patient. I have signed out the follow up of any pending tests (i.e. labs, radiological studies) to the resident.  I have communicated the patient’s plan of care and disposition with the resident.  The history, relevant review of systems, past medical and surgical history, medical decision making, and physical examination was documented by the scribe in my presence and I attest to the accuracy of the documentation.
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
## 2024-10-28 NOTE — ED ADULT NURSE REASSESSMENT NOTE - NS ED NURSE REASSESS COMMENT FT1
Pt received from ED A+Ox4, NAD, VSS. Pt oriented to SURG7 unit and hospital room. Educated on call bell; Call bell is within reach. Awaiting full admission orders. Care continues as currently ordered. Patient profile completed. Fall and safety precautions maintained. All questions answered. Pt updated on current plan of care. Denies current sob, dyspnea, N/V. Respirations are equal and unlabored. Belongings at bedside. NSR on cardiac monitor.

## 2024-10-28 NOTE — H&P ADULT - ASSESSMENT
The patient is a 59y year old Female with significant PMH of HTN and HLD presented to the ED with c/o CP x 2-4 week, but got worse today, so came to the ED for evaluation. Pt has an appt with her cardiologist in 1 week. Pt also c/o HA.  She has strong family history of early cardiac event (father with triple bypass at age 38), and she quit smoking just few months ago.  Initally her chest pain was on left side which has moved to her substernal area, was 11/10, and now 3/10 in intensity, associated with on and off sob, no aggravating or alleviating factors.  Sge denies palpitation, N/V, abdominal pain, diarrhea or dysuria.   Sig labs: WBC 11.22 otherwise unremarkable.  D-dimer normal.  Troponin x 2 negative. Mg, TSH and Pro-BNP all normal.  EK bpm in NSR. No acute ST-T changes.   CXR negative for any acute process.    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 There are blood products in multiple stages of evolution. There is no vasogenic edema to suggest fresh hemorrhage.     mg po given in ED. The patient is a 59y year old Female with significant PMH of HTN and HLD presented to the ED with c/o CP x 2-4 week, but got worse today, so came to the ED for evaluation. Pt has an appt with her cardiologist in 1 week. Pt also c/o HA.  She has strong family history of early cardiac event (father with triple bypass at age 38), and she quit smoking just few months ago.  Initally her chest pain was on left side which has moved to her substernal area, was 11/10, and now 3/10 in intensity, associated with on and off sob, no aggravating or alleviating factors.  Sge denies palpitation, N/V, abdominal pain, diarrhea or dysuria.     # Precordial chest pain with h/o HTN, HPL, Smoker and strong family h/o early heart disease.  -Troponin x 2 negative, D-dimer negative.  -CXR unremarkable.  -Admit to telemetry.  -NPO after midnight.  -Serial troponin.  Lipid panel.  -ASA 81 mg po daily.  -SL nitro prn.  -Continue her Rosuvastatin.  -Cardiology consult placed.    # HTN.  -Stable and controlled.  -C/w her Lisinopril and HCTZ.    # Dyslipidemia.  -On Rosuvastatin.    # DVT prophylaxis: SCDs in the setting of h/o cavernoma with blood products in multiple stages of evolution per recent MRI brain.

## 2024-10-28 NOTE — ED ADULT TRIAGE NOTE - CHIEF COMPLAINT QUOTE
PT C/O CHEST PAIN X 2WEEKS WORSENING TODAY. AS PER PT " I AM HAVING BURNING CHEST PAIN TRAVELING DOWN MY ARM, AND NOW HAVING A HARD TIME BREATHING". DENIES N/V/D/FEVER/CHILLS. PMH: DMII EKG IN TRIAGE
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
## 2024-10-28 NOTE — ED STATDOCS - PROGRESS NOTE DETAILS
[place] PGY3 - 59-year-old female with history of HTN, HLD, former tobacco use and family history of early cardiac event (father with triple bypass at 38) presenting with left-sided burning chest pain that has been persistent and worsening.  Lab work grossly unremarkable, CXR shows clear lungs, EKG sinus tach without any acute ST changes.  New bifascicular block changed from February 2023.  Patient offered admission and accepts given family cardiac history.  Admit to medicine for further cardiac workup.

## 2024-10-28 NOTE — ED STATDOCS - IN ACCORDANCE WITH [place] LAW, WE OFFER EVERY PATIENT A HEPATITIS C TEST. WOULD YOU LIKE TO BE TESTED TODAY?
Pt contacted, I apologized for the inconvience and informed MFM reached her before we where able to reach and advise and apologized for that  Pt advised as directed  Pt eduardo screamed at me while I was advising stating she is not doing the testing she works nights and she doesn't have time to go to [place] doctors appointment when no one had the common decency to tell her at her visit and then asked me why is this being recommended I advised due to obesity and advance maternal age  Pt became more angry and stated and I quote " she is fat so what"  I advised pt testing is to monitor the baby's heart rate and monitor for both their well beings  Pt claimed I cut her off and stated she is not doing it since she was not advised prior to call  I advised I will inform provider and management  Pt hung up  Opt out

## 2024-10-28 NOTE — ED ADULT NURSE NOTE - IS THE PATIENT ABLE TO BE SCREENED?
Diagnosis: Gross hematuria with clot urinary retention, history of prostate cancer, history of bladder cancer  Procedure: EUA, video cystopanendoscopy, evacuation of clots, fulguration of prostatic veins  Surgeon: James  Anesthesia: General laryngeal mask  EBL: 100 cc of form clots  Findings: No bladder tumor or stone.  Prominent veins on prostatic surface.  Normal ureteral orifices.  Benign feeling prostate on EUA   Yes

## 2024-10-28 NOTE — ED STATDOCS - CLINICAL SUMMARY MEDICAL DECISION MAKING FREE TEXT BOX
Pt with CP. Plan: screening EKG, CXR, labs, reeval. Pt with CP. Plan: screening EKG, CXR, labs, reeval.    Rodger DO: patient still with persistent chest pain; multiple cardiac and family risk factors; will admit for r/o ACS at this time.

## 2024-10-28 NOTE — PATIENT PROFILE ADULT - FUNCTIONAL ASSESSMENT - BASIC MOBILITY 6.
4-calculated by average/Not able to assess (calculate score using [facility] averaging method)

## 2024-10-28 NOTE — H&P ADULT - NSTOBACCOSCREENHP_GEN_A_CS
----- Message from Bea Pizano sent at 8/19/2024  8:47 AM CDT -----  Regarding: Reschedule  Contact: [phone]  Type:  Needs Medical Advice    Who Called: Ana Perez called she has several appointments today and wanting to know if she can reschedule her walk for today 12:15p. Appointment scheduled for tomorrow at 11:40  Would the patient rather a call back or a response via [facility]? Call back  Best Call Back Number: [phone]    Additional Information:  
No

## 2024-10-28 NOTE — ED ADULT NURSE NOTE - OBJECTIVE STATEMENT
Ambulatory to ER with c/o chest pain x 2 weeks. Patient a & ox4, respirations even and unlabored  room air. Await MD montiel.

## 2024-10-28 NOTE — H&P ADULT - NSHPLABSRESULTS_GEN_ALL_CORE
LABS:                        13.8   11.22 )-----------( 248      ( 28 Oct 2024 15:54 )             41.4     10-28    139  |  106  |  13  ----------------------------<  109[H]  3.9   |  29  |  0.69    Ca    9.7      28 Oct 2024 15:54  Mg     2.2     10-28    TPro  7.6  /  Alb  3.7  /  TBili  0.2  /  DBili  x   /  AST  22  /  ALT  34  /  Alk Phos  86  10-28    PT/INR - ( 28 Oct 2024 15:54 )   PT: 10.1 sec;   INR: 0.88 ratio         PTT - ( 28 Oct 2024 15:54 )  PTT:30.4 sec.      < from: Xray Chest 1 View- PORTABLE-Urgent (Xray Chest 1 View- PORTABLE-Urgent .) (10.28.24 @ 16:44) >    IMPRESSION: No acute cardiopulmonary disease process.    --- End of Report ---    < end of copied text >        < from: MR Head w/wo IV Cont (10.09.24 @ 16:43) >    COMPARISON: Head CT 9/19/2024.    FINDINGS:    There is a cavernoma at the right forceps major corresponding to the CT   abnormality, measuring 8 mm. There are blood products in multiple stages   of evolution. There is no vasogenic edema to suggest fresh hemorrhage.    Cerebral volume is within normal limits. No premature white matter   disease.    No acute intracranial hemorrhage. No midline shift or herniation. No   acute ischemia. Intracranial flow voids are patent. The cerebellar   tonsils are normally positioned.    Limited views of the sinuses and mastoids show mild mucosal thickening   without air-fluid levels, likely chronic.    Limited views of the orbits and visualized soft tissues of the neck,   face, scalp, skull base, and calvarium are otherwise unremarkable.    IMPRESSION:    1.  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        Patient Name: BELKIS DUGAN  MRN: [id number], Accession: [id number]  DOS: 10/09/24 04:43 PM    --- End of Report ---    < end of copied text >

## 2024-10-29 ENCOUNTER — TRANSCRIPTION ENCOUNTER (OUTPATIENT)
Age: 59
End: 2024-10-29

## 2024-10-29 VITALS
HEART RATE: 87 BPM | RESPIRATION RATE: 18 BRPM | TEMPERATURE: 99 F | DIASTOLIC BLOOD PRESSURE: 66 MMHG | OXYGEN SATURATION: 99 % | SYSTOLIC BLOOD PRESSURE: 135 MMHG

## 2024-10-29 LAB
ANION GAP SERPL CALC-SCNC: 1 MMOL/L — LOW (ref 5–17)
BUN SERPL-MCNC: 17 MG/DL — SIGNIFICANT CHANGE UP (ref 7–23)
CALCIUM SERPL-MCNC: 9.2 MG/DL — SIGNIFICANT CHANGE UP (ref 8.5–10.1)
CHLORIDE SERPL-SCNC: 110 MMOL/L — HIGH (ref 96–108)
CHOLEST SERPL-MCNC: 241 MG/DL — HIGH
CO2 SERPL-SCNC: 33 MMOL/L — HIGH (ref 22–31)
CREAT SERPL-MCNC: 0.71 MG/DL — SIGNIFICANT CHANGE UP (ref 0.5–1.3)
EGFR: 98 ML/MIN/1.73M2 — SIGNIFICANT CHANGE UP
GLUCOSE SERPL-MCNC: 99 MG/DL — SIGNIFICANT CHANGE UP (ref 70–99)
HCT VFR BLD CALC: 41.3 % — SIGNIFICANT CHANGE UP (ref 34.5–45)
HDLC SERPL-MCNC: 55 MG/DL — SIGNIFICANT CHANGE UP
HGB BLD-MCNC: 13.4 G/DL — SIGNIFICANT CHANGE UP (ref 11.5–15.5)
LIPID PNL WITH DIRECT LDL SERPL: 146 MG/DL — HIGH
MCHC RBC-ENTMCNC: 30.5 PG — SIGNIFICANT CHANGE UP (ref 27–34)
MCHC RBC-ENTMCNC: 32.4 GM/DL — SIGNIFICANT CHANGE UP (ref 32–36)
MCV RBC AUTO: 94.1 FL — SIGNIFICANT CHANGE UP (ref 80–100)
NON HDL CHOLESTEROL: 186 MG/DL — HIGH
PLATELET # BLD AUTO: 239 K/UL — SIGNIFICANT CHANGE UP (ref 150–400)
POTASSIUM SERPL-MCNC: 4.4 MMOL/L — SIGNIFICANT CHANGE UP (ref 3.5–5.3)
POTASSIUM SERPL-SCNC: 4.4 MMOL/L — SIGNIFICANT CHANGE UP (ref 3.5–5.3)
RBC # BLD: 4.39 M/UL — SIGNIFICANT CHANGE UP (ref 3.8–5.2)
RBC # FLD: 13 % — SIGNIFICANT CHANGE UP (ref 10.3–14.5)
SODIUM SERPL-SCNC: 144 MMOL/L — SIGNIFICANT CHANGE UP (ref 135–145)
TRIGL SERPL-MCNC: 220 MG/DL — HIGH
TROPONIN I, HIGH SENSITIVITY RESULT: 3.71 NG/L — SIGNIFICANT CHANGE UP
WBC # BLD: 8.97 K/UL — SIGNIFICANT CHANGE UP (ref 3.8–10.5)
WBC # FLD AUTO: 8.97 K/UL — SIGNIFICANT CHANGE UP (ref 3.8–10.5)

## 2024-10-29 PROCEDURE — 99233 SBSQ HOSP IP/OBS HIGH 50: CPT

## 2024-10-29 RX ORDER — SUMATRIPTAN SUCCINATE 6 MG/.5ML
50 INJECTION, SOLUTION SUBCUTANEOUS ONCE
Refills: 0 | Status: COMPLETED | OUTPATIENT
Start: 2024-10-29 | End: 2024-10-29

## 2024-10-29 RX ORDER — SODIUM CHLORIDE 9 MG/ML
1000 INJECTION, SOLUTION INTRAMUSCULAR; INTRAVENOUS; SUBCUTANEOUS
Refills: 0 | Status: DISCONTINUED | OUTPATIENT
Start: 2024-10-29 | End: 2024-10-29

## 2024-10-29 RX ORDER — SODIUM CHLORIDE 9 MG/ML
250 INJECTION, SOLUTION INTRAMUSCULAR; INTRAVENOUS; SUBCUTANEOUS ONCE
Refills: 0 | Status: COMPLETED | OUTPATIENT
Start: 2024-10-29 | End: 2024-10-29

## 2024-10-29 RX ORDER — ASPIRIN/MAG CARB/ALUMINUM AMIN 325 MG
1 TABLET ORAL
Qty: 30 | Refills: 1
Start: 2024-10-29 | End: 2024-12-27

## 2024-10-29 RX ADMIN — SODIUM CHLORIDE 120 MILLILITER(S): 9 INJECTION, SOLUTION INTRAMUSCULAR; INTRAVENOUS; SUBCUTANEOUS at 16:03

## 2024-10-29 RX ADMIN — SODIUM CHLORIDE 250 MILLILITER(S): 9 INJECTION, SOLUTION INTRAMUSCULAR; INTRAVENOUS; SUBCUTANEOUS at 12:43

## 2024-10-29 RX ADMIN — Medication 81 MILLIGRAM(S): at 08:11

## 2024-10-29 RX ADMIN — SUMATRIPTAN SUCCINATE 50 MILLIGRAM(S): 6 INJECTION, SOLUTION SUBCUTANEOUS at 12:20

## 2024-10-29 RX ADMIN — Medication 650 MILLIGRAM(S): at 08:10

## 2024-10-29 NOTE — DISCHARGE NOTE PROVIDER - NSDCCPCAREPLAN_GEN_ALL_CORE_FT
PRINCIPAL DISCHARGE DIAGNOSIS  Diagnosis: Chest pain  Assessment and Plan of Treatment: [facility] revealing normal coronaries   Take medication reviewed today with you on the medication reconciliation sheet.  Follow up with cardiologist   Mild soreness at the procedure site is expected. It should get better over the next few days. Black and blue color may occur and move down the procedure site   Activity: Rest today. You may drive in 24 hours. NO strenuous activity, straining, heavy lifting, pushing or pulling for 3 days. Get medical clearance from cardiologist before going back to exercise other than walking  Bandage: Keep bandage clean and dry. Remove after 24 hours.    Bathing: You may shower tomorrow. No baths/sitting in pools for 7 days.  If the procedure was done in your wrist, avoid reaching or placing too much pressure on that arm or wrist for 48 hours. If the procedure was done in your leg ( groin), limit stair climbing for 48 hours.  If you have bleeding from the procedure site: Lie down and remove the bandage. Apply hard pressure and call your doctor. If bleeding and swelling cannot be controlled, call 911.      SECONDARY DISCHARGE DIAGNOSES  Diagnosis: Hypertension  Assessment and Plan of Treatment: Continue current blood pressure medication regimen as directed. Monitor for any visual changes, headaches or dizziness.  Monitor blood pressure regularly.  Follow up with your PCP for further management for high blood pressure, please call to make appointment within 1 week of discharge    Diagnosis: HLD (hyperlipidemia)  Assessment and Plan of Treatment: Continue cholesterol control medications. Continue DASH diet. Follow up with your PCP within 1 week of discharge for further management and monitoring of lipid and cholesterol panels. Please call to make an appointment

## 2024-10-29 NOTE — DISCHARGE NOTE NURSING/CASE MANAGEMENT/SOCIAL WORK - PATIENT PORTAL LINK FT
You can access the FollowMyHealth Patient Portal offered by [facility] by registering at the following website: [email or web address]. By joining [employer]’s FollowMyHealth portal, you will also be able to view your health information using other applications (apps) compatible with our system.

## 2024-10-29 NOTE — PROGRESS NOTE ADULT - ASSESSMENT
Patient is a 89y old  Female who presents with a chief complaint of Anemia (25 Mar 2023 18:18)      HPI:  89F w/ h/o chronic anemia, HTN, DLD p/w anemia. Pt is from [facility]. Had outpatient labwork, which showed WBC 44.8, Hb 6.3, PLT 35, and Ferritin >1500. No reported fevers, chills, CP, palpitations, SOB, abdominal pain, n/v/d, dysuria, or LE swelling.    In ED, pt HD stable on vitals. DELANO positive for melena. Labs significant for WBC 62.16, Hb 6.9, PLT 35, BNP 2164, Tn 0.03, and Lactate 0.9. CTA AP demonstrated no evidence of active GI bleed, but did show inflammatory changes involving CBD within the pancreatic head raising a   question of cholangitis. S/P 1u pRBC, pantoprazole 40mg IV x1, and Lasix 20mg IV x1. Subsequently admitted to telemetry for ACS r/o and anemia. (25 Mar 2023 18:18)       ROS:  Negative except for:    PAST MEDICAL & SURGICAL HISTORY:  Chronic anemia      HTN (hypertension)      Dyslipidemia          SOCIAL HISTORY:    FAMILY HISTORY:      MEDICATIONS  (STANDING):  atorvastatin 80 milliGRAM(s) Oral at bedtime  calcium carbonate    500 mG (Tums) Chewable 1 Tablet(s) Chew daily  ferrous    sulfate 325 milliGRAM(s) Oral daily  gabapentin 100 milliGRAM(s) Oral three times a day  lactated ringers. 1000 milliLiter(s) (75 mL/Hr) IV Continuous <Continuous>  melatonin 5 milliGRAM(s) Oral at bedtime  multivitamin 1 Tablet(s) Oral daily  oxybutynin 5 milliGRAM(s) Oral daily  pantoprazole  Injectable 40 milliGRAM(s) IV Push two times a day  piperacillin/tazobactam IVPB. 3.375 Gram(s) IV Intermittent once  piperacillin/tazobactam IVPB.. 3.375 Gram(s) IV Intermittent every 8 hours  polyethylene glycol 3350 17 Gram(s) Oral daily  senna 2 Tablet(s) Oral at bedtime    MEDICATIONS  (PRN):  acetaminophen     Tablet .. 650 milliGRAM(s) Oral every 6 hours PRN Temp greater or equal to 38C (100.4F), Mild Pain (1 - 3)      Allergies    sulfa drugs (Other)    Intolerances        Vital Signs Last 24 Hrs  T(C): 35.6 (26 Mar 2023 04:53), Max: 36.1 (25 Mar 2023 11:30)  T(F): 96.1 (26 Mar 2023 04:53), Max: 97 (25 Mar 2023 11:30)  HR: 82 (26 Mar 2023 04:53) (67 - 82)  BP: 147/58 (26 Mar 2023 04:53) (113/57 - 147/58)  BP(mean): --  RR: 18 (26 Mar 2023 04:53) (18 - 18)  SpO2: 100% (25 Mar 2023 13:00) (100% - 100%)    Parameters below as of 25 Mar 2023 13:00  Patient On (Oxygen Delivery Method): nasal cannula  O2 Flow (L/min): 2      PHYSICAL EXAM  General: adult in NAD  HEENT: clear oropharynx, anicteric sclera, pink conjunctiva  Neck: supple  CV: normal S1/S2 with no murmur rubs or gallops  Lungs: positive air movement b/l ant lungs,clear to auscultation, no wheezes, no rales  Abdomen: soft non-tender non-distended, no hepatosplenomegaly  Ext: no clubbing cyanosis or edema  Skin: no rashes and no petechiae  Neuro: alert and oriented X 4, no focal deficits      LABS:                          8.2    44.15 )-----------( 36       ( 25 Mar 2023 23:00 )             27.4         Mean Cell Volume : 103.0 fL  Mean Cell Hemoglobin : 30.8 pg  Mean Cell Hemoglobin Concentration : 29.9 g/dL  Auto Neutrophil # : 7.74 K/uL  Auto Lymphocyte # : 10.18 K/uL  Auto Monocyte # : 24.38 K/uL  Auto Eosinophil # : 0.41 K/uL  Auto Basophil # : 0.33 K/uL  Auto Neutrophil % : 17.6 %  Auto Lymphocyte % : 23.1 %  Auto Monocyte % : 55.2 %  Auto Eosinophil % : 0.9 %  Auto Basophil % : 0.7 %      Serial CBC's  03-25 @ 23:00  Hct-27.4 / Hgb-8.2 / Plat-36 / RBC-2.66 / WBC-44.15  Serial CBC's  03-25 @ 00:30  Hct-23.5 / Hgb-6.9 / Plat-35 / RBC-2.28 / WBC-62.16      03-25    137  |  103  |  29<H>  ----------------------------<  120<H>  4.0   |  20  |  1.3    Ca    8.0<L>      25 Mar 2023 23:00  Phos  4.8     03-25  Mg     1.6     03-25    TPro  5.8<L>  /  Alb  2.5<L>  /  TBili  0.6  /  DBili  x   /  AST  38  /  ALT  24  /  AlkPhos  69  03-25      PT/INR - ( 25 Mar 2023 23:00 )   PT: 13.80 sec;   INR: 1.20 ratio         PTT - ( 25 Mar 2023 23:00 )  PTT:30.0 sec    Reticulocyte Percent: 2.3 % (03-25 @ 23:00)              BLOOD SMEAR INTERPRETATION:       RADIOLOGY & ADDITIONAL STUDIES:     A/P:      # Precordial chest pain with h/o HTN, HPL, Smoker and strong family h/o early heart disease.  -Troponin x 2 negative, D-dimer negative.  -CXR unremarkable.  -Admitted to telemetry.  -Serial troponin-neg x3  -ASA 81 mg po daily.  -SL nitro prn.  -Continue her Rosuvastatin.  -Cardiology consult pending    # HTN.  -Stable and controlled.  -C/w her Lisinopril and HCTZ.    # Dyslipidemia.  -On Rosuvastatin.    # DVT prophylaxis: SCDs in the setting of h/o cavernoma with blood products in multiple stages of evolution per recent MRI brain. Patient is a 89y old  Female who presents with a chief complaint of Anemia (25 Mar 2023 18:18)      HPI:  89F w/ h/o chronic anemia, HTN, DLD p/w anemia. Pt is from [facility]. Had outpatient labwork, which showed WBC 44.8, Hb 6.3, PLT 35, and Ferritin >1500. No reported fevers, chills, CP, palpitations, SOB, abdominal pain, n/v/d, dysuria, or LE swelling.    In ED, pt HD stable on vitals. DELANO positive for melena. Labs significant for WBC 62.16, Hb 6.9, PLT 35, BNP 2164, Tn 0.03, and Lactate 0.9. CTA AP demonstrated no evidence of active GI bleed, but did show inflammatory changes involving CBD within the pancreatic head raising a   question of cholangitis. S/P 1u pRBC, pantoprazole 40mg IV x1, and Lasix 20mg IV x1. Subsequently admitted to telemetry for ACS r/o and anemia. (25 Mar 2023 18:18)       ROS:  Negative except for: above.    PAST MEDICAL & SURGICAL HISTORY:  Chronic anemia  HTN (hypertension)  Dyslipidemia    SOCIAL HISTORY: Denies alcohol or tobacco use.    FAMILY HISTORY: Denies pertinent family hx.    MEDICATIONS  (STANDING):  atorvastatin 80 milliGRAM(s) Oral at bedtime  calcium carbonate    500 mG (Tums) Chewable 1 Tablet(s) Chew daily  ferrous    sulfate 325 milliGRAM(s) Oral daily  gabapentin 100 milliGRAM(s) Oral three times a day  lactated ringers. 1000 milliLiter(s) (75 mL/Hr) IV Continuous <Continuous>  melatonin 5 milliGRAM(s) Oral at bedtime  multivitamin 1 Tablet(s) Oral daily  oxybutynin 5 milliGRAM(s) Oral daily  pantoprazole  Injectable 40 milliGRAM(s) IV Push two times a day  piperacillin/tazobactam IVPB. 3.375 Gram(s) IV Intermittent once  piperacillin/tazobactam IVPB.. 3.375 Gram(s) IV Intermittent every 8 hours  polyethylene glycol 3350 17 Gram(s) Oral daily  senna 2 Tablet(s) Oral at bedtime    MEDICATIONS  (PRN):  acetaminophen     Tablet .. 650 milliGRAM(s) Oral every 6 hours PRN Temp greater or equal to 38C (100.4F), Mild Pain (1 - 3)      Allergies    sulfa drugs (Other)    Intolerances        Vital Signs Last 24 Hrs  T(C): 35.6 (26 Mar 2023 04:53), Max: 36.1 (25 Mar 2023 11:30)  T(F): 96.1 (26 Mar 2023 04:53), Max: 97 (25 Mar 2023 11:30)  HR: 82 (26 Mar 2023 04:53) (67 - 82)  BP: 147/58 (26 Mar 2023 04:53) (113/57 - 147/58)  BP(mean): --  RR: 18 (26 Mar 2023 04:53) (18 - 18)  SpO2: 100% (25 Mar 2023 13:00) (100% - 100%)    Parameters below as of 25 Mar 2023 13:00  Patient On (Oxygen Delivery Method): nasal cannula  O2 Flow (L/min): 2      PHYSICAL EXAM  General: adult in NAD, difficulty hearing   HEENT: clear oropharynx, anicteric sclera, pink conjunctiva  Neck: supple  CV: normal S1/S2 with no murmur rubs or gallops  Lungs: positive air movement b/l ant lungs,clear to auscultation, no wheezes, no rales  Abdomen: soft non-tender non-distended, no hepatosplenomegaly  Ext: no clubbing cyanosis or edema  Skin: no rashes and no petechiae  Neuro: no focal deficits      LABS:                          8.2    44.15 )-----------( 36       ( 25 Mar 2023 23:00 )             27.4         Mean Cell Volume : 103.0 fL  Mean Cell Hemoglobin : 30.8 pg  Mean Cell Hemoglobin Concentration : 29.9 g/dL  Auto Neutrophil # : 7.74 K/uL  Auto Lymphocyte # : 10.18 K/uL  Auto Monocyte # : 24.38 K/uL  Auto Eosinophil # : 0.41 K/uL  Auto Basophil # : 0.33 K/uL  Auto Neutrophil % : 17.6 %  Auto Lymphocyte % : 23.1 %  Auto Monocyte % : 55.2 %  Auto Eosinophil % : 0.9 %  Auto Basophil % : 0.7 %      Serial CBC's  03-25 @ 23:00  Hct-27.4 / Hgb-8.2 / Plat-36 / RBC-2.66 / WBC-44.15  Serial CBC's  03-25 @ 00:30  Hct-23.5 / Hgb-6.9 / Plat-35 / RBC-2.28 / WBC-62.16      03-25    137  |  103  |  29<H>  ----------------------------<  120<H>  4.0   |  20  |  1.3    Ca    8.0<L>      25 Mar 2023 23:00  Phos  4.8     03-25  Mg     1.6     03-25    TPro  5.8<L>  /  Alb  2.5<L>  /  TBili  0.6  /  DBili  x   /  AST  38  /  ALT  24  /  AlkPhos  69  03-25      PT/INR - ( 25 Mar 2023 23:00 )   PT: 13.80 sec;   INR: 1.20 ratio         PTT - ( 25 Mar 2023 23:00 )  PTT:30.0 sec    Reticulocyte Percent: 2.3 % (03-25 @ 23:00)              BLOOD SMEAR INTERPRETATION:       RADIOLOGY & ADDITIONAL STUDIES:    < from: CT Angio Abdomen and Pelvis w/ IV Cont (03.25.23 @ 04:41) >  IMPRESSION:  Inflammatory changes involving CBD within the pancreatic head raising a   question of cholangitis. Please correlate with symptoms.  No CTA evidence of active gastrointestinal bleed.  Otherwise, no evidence of acute abdominal or pelvic pathology.  --- End of Report ---  < end of copied text >    < from: Xray Chest 1 View-PORTABLE IMMEDIATE (03.25.23 @ 01:06) >  Impression:  No radiographic evidence of acute cardiopulmonary disease.  < end of copied text > Patient is a 89y old  Female who presents with a chief complaint of Anemia (25 Mar 2023 18:18)      HPI:  89F w/ h/o chronic anemia, HTN, DLD p/w anemia. Pt is from [facility]. Had outpatient lab work, which showed WBC 44.8, Hb 6.3, PLT 35, and Ferritin >1500. No reported fevers, chills, CP, palpitations, SOB, abdominal pain, n/v/d, dysuria, or LE swelling.    In ED, pt HD stable on vitals. DELANO positive for melena. Labs significant for WBC 62.16, Hb 6.9, PLT 35, BNP 2164, Tn 0.03, and Lactate 0.9. CTA AP demonstrated no evidence of active GI bleed, but did show inflammatory changes involving CBD within the pancreatic head raising a question of cholangitis. S/P 1u pRBC, pantoprazole 40mg IV x1, and Lasix 20mg IV x1. Subsequently admitted to telemetry for ACS r/o and anemia. (25 Mar 2023 18:18)     ROS:  Negative except for: above.    PAST MEDICAL & SURGICAL HISTORY:  Chronic anemia  HTN (hypertension)  Dyslipidemia    SOCIAL HISTORY: Denies alcohol or tobacco use.    FAMILY HISTORY: Denies pertinent family hx.    MEDICATIONS  (STANDING):  atorvastatin 80 milliGRAM(s) Oral at bedtime  calcium carbonate    500 mG (Tums) Chewable 1 Tablet(s) Chew daily  ferrous    sulfate 325 milliGRAM(s) Oral daily  gabapentin 100 milliGRAM(s) Oral three times a day  lactated ringers. 1000 milliLiter(s) (75 mL/Hr) IV Continuous <Continuous>  melatonin 5 milliGRAM(s) Oral at bedtime  multivitamin 1 Tablet(s) Oral daily  oxybutynin 5 milliGRAM(s) Oral daily  pantoprazole  Injectable 40 milliGRAM(s) IV Push two times a day  piperacillin/tazobactam IVPB. 3.375 Gram(s) IV Intermittent once  piperacillin/tazobactam IVPB.. 3.375 Gram(s) IV Intermittent every 8 hours  polyethylene glycol 3350 17 Gram(s) Oral daily  senna 2 Tablet(s) Oral at bedtime    MEDICATIONS  (PRN):  acetaminophen     Tablet .. 650 milliGRAM(s) Oral every 6 hours PRN Temp greater or equal to 38C (100.4F), Mild Pain (1 - 3)      Allergies    sulfa drugs (Other)    Intolerances        Vital Signs Last 24 Hrs  T(C): 35.6 (26 Mar 2023 04:53), Max: 36.1 (25 Mar 2023 11:30)  T(F): 96.1 (26 Mar 2023 04:53), Max: 97 (25 Mar 2023 11:30)  HR: 82 (26 Mar 2023 04:53) (67 - 82)  BP: 147/58 (26 Mar 2023 04:53) (113/57 - 147/58)  BP(mean): --  RR: 18 (26 Mar 2023 04:53) (18 - 18)  SpO2: 100% (25 Mar 2023 13:00) (100% - 100%)    Parameters below as of 25 Mar 2023 13:00  Patient On (Oxygen Delivery Method): nasal cannula  O2 Flow (L/min): 2      PHYSICAL EXAM  General: adult in NAD, difficulty hearing   HEENT: clear oropharynx, anicteric sclerae, pink conjunctivae  Neck: supple  CV: normal S1/S2 with no murmur rubs or gallops  Lungs: positive air movement b/l ant, clear to auscultation, no wheezes, no rales but poor respiratory effort.  Abdomen: soft non-tender non-distended, no hepatosplenomegaly  Ext: no clubbing cyanosis or edema  Skin: no rashes and no petechiae  Neuro: no focal deficits      LABS:                          8.2    44.15 )-----------( 36       ( 25 Mar 2023 23:00 )             27.4         Mean Cell Volume : 103.0 fL  Mean Cell Hemoglobin : 30.8 pg  Mean Cell Hemoglobin Concentration : 29.9 g/dL  Auto Neutrophil # : 7.74 K/uL  Auto Lymphocyte # : 10.18 K/uL  Auto Monocyte # : 24.38 K/uL  Auto Eosinophil # : 0.41 K/uL  Auto Basophil # : 0.33 K/uL  Auto Neutrophil % : 17.6 %  Auto Lymphocyte % : 23.1 %  Auto Monocyte % : 55.2 %  Auto Eosinophil % : 0.9 %  Auto Basophil % : 0.7 %      Serial CBC's  03-25 @ 23:00  Hct-27.4 / Hgb-8.2 / Plat-36 / RBC-2.66 / WBC-44.15  Serial CBC's  03-25 @ 00:30  Hct-23.5 / Hgb-6.9 / Plat-35 / RBC-2.28 / WBC-62.16      03-25    137  |  103  |  29<H>  ----------------------------<  120<H>  4.0   |  20  |  1.3    Ca    8.0<L>      25 Mar 2023 23:00  Phos  4.8     03-25  Mg     1.6     03-25    TPro  5.8<L>  /  Alb  2.5<L>  /  TBili  0.6  /  DBili  x   /  AST  38  /  ALT  24  /  AlkPhos  69  03-25      PT/INR - ( 25 Mar 2023 23:00 )   PT: 13.80 sec;   INR: 1.20 ratio         PTT - ( 25 Mar 2023 23:00 )  PTT:30.0 sec    Reticulocyte Percent: 2.3 % (03-25 @ 23:00)              BLOOD SMEAR INTERPRETATION:       RADIOLOGY & ADDITIONAL STUDIES:    < from: CT Angio Abdomen and Pelvis w/ IV Cont (03.25.23 @ 04:41) >  IMPRESSION:  Inflammatory changes involving CBD within the pancreatic head raising a   question of cholangitis. Please correlate with symptoms.  No CTA evidence of active gastrointestinal bleed.  Otherwise, no evidence of acute abdominal or pelvic pathology.  --- End of Report ---  < end of copied text >    < from: Xray Chest 1 View-PORTABLE IMMEDIATE (03.25.23 @ 01:06) >  Impression:  No radiographic evidence of acute cardiopulmonary disease.  < end of copied text >

## 2024-10-29 NOTE — DISCHARGE NOTE PROVIDER - HOSPITAL COURSE
59y year old Female with significant PMH of HTN and HLD presented to the ED with c/o CP x 2-4 week, but got worse today, so came to the ED for evaluation. Pt has an appt with her cardiologist in 1 week. Pt also c/o HA.  She has strong family history of early cardiac event (father with triple bypass at age 38), and she quit smoking just few months ago.  Initally her chest pain was on left side which has moved to her substernal area, was 11/10, and now 3/10 in intensity, associated with on and off sob, no aggravating or alleviating factors.  Sge denies palpitation, N/V, abdominal pain, diarrhea or dysuria.   Sig labs: WBC 11.22 otherwise unremarkable.  D-dimer normal.  Troponin x 2 negative. Mg, TSH and Pro-BNP all normal.  EK bpm in NSR. No acute ST-T changes.   CXR negative for any acute process.    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 There are blood products in multiple stages of evolution. There is no vasogenic edema to suggest fresh hemorrhage.     mg po given in ED.     Precordial chest pain with h/o HTN, HPL, Smoker and strong family h/o early heart disease.  -Troponin x 2 negative, D-dimer negative.  -CXR unremarkable.  -Admitted to telemetry.  -Serial troponin-neg x3  -ASA 81 mg po daily.  -SL nitro prn.  -Continue her Rosuvastatin.  LHC done revealing normal cors    # HTN.  -Stable and controlled.  -C/w her Lisinopril and HCTZ.    # Dyslipidemia.  -On Rosuvastatin.    On _ 10/29__ this case was discussed with  _Tristan___ who agrees that this patient is medically stable for discharge.

## 2024-10-29 NOTE — PROGRESS NOTE ADULT - SUBJECTIVE AND OBJECTIVE BOX
Patient is seen and examined. Chart is reviewed. Admitted for chest pain.    Gen: No fever, chills, weakness  ENT: No visual changes or throat pain  Neck: No pain or stiffness  Respiratory: No cough or wheezing  Cardiovascular: ++ chest pain or palpitations  Gastrointestinal: No abdominal pain, nausea, vomiting, constipation, or diarrhea  Hematologic: No easy bleeding or bruising  Neurologic: No numbness or focal weakness  Psych: No depression or insomnia  Skin: No rash or itching      MEDICATIONS  (STANDING):  aspirin enteric coated 81 milliGRAM(s) Oral daily  hydrochlorothiazide 12.5 milliGRAM(s) Oral daily  influenza   Vaccine 0.5 milliLiter(s) IntraMuscular once  lisinopril 40 milliGRAM(s) Oral daily  rosuvastatin 5 milliGRAM(s) Oral at bedtime    MEDICATIONS  (PRN):  acetaminophen     Tablet .. 650 milliGRAM(s) Oral every 6 hours PRN Temp greater or equal to 38C (100.4F), Mild Pain (1 - 3)  aluminum hydroxide/magnesium hydroxide/simethicone Suspension 30 milliLiter(s) Oral every 4 hours PRN Dyspepsia  melatonin 3 milliGRAM(s) Oral at bedtime PRN Insomnia  nitroglycerin     SubLingual 0.4 milliGRAM(s) SubLingual every 5 minutes PRN Chest Pain  ondansetron Injectable 4 milliGRAM(s) IV Push every 8 hours PRN Nausea and/or Vomiting      Vital Signs Last 24 Hrs  T(C): 37 (29 Oct 2024 07:58), Max: 37.6 (29 Oct 2024 06:33)  T(F): 98.6 (29 Oct 2024 07:58), Max: 99.6 (29 Oct 2024 06:33)  HR: 72 (29 Oct 2024 07:58) (70 - 103)  BP: 106/69 (29 Oct 2024 07:58) (92/62 - 134/83)  BP(mean): 77 (29 Oct 2024 07:58) (70 - 98)  RR: 18 (29 Oct 2024 07:58) (17 - 18)  SpO2: 97% (29 Oct 2024 07:58) (95% - 99%)    Parameters below as of 29 Oct 2024 07:58  Patient On (Oxygen Delivery Method): room air        GEN: NAD, comfortable, resting in bed  HEENT: NC/AT, EOMI, PERRLA, MMM, clear conjunctiva and sclera, normal hearing, no nasal discharge, throat clear, no thrush, normal dentition.   NECK: supple, no JVD, no LAD, no thyromegaly  BACK:  ROM intact, no spinal/paraspinal tenderness  CV: S1S2, RRR, no mumur  RESP: good air movement, CTABL, no rales, rhonchi or wheezing, respirations unlabored  ABD: +BS, soft, ND, NT, no guarding, no rigidity, no HSM  EXT: +2 radial and pedal pulses, no edema, no calve tenderness  SKIN: No visible Rashes or Ulcers  MSK: ROM intact in all extremities  NEURO:  sensory and CN 2-12 Grossly intact, no motor deficits, no, saddle anesthesia, AOx3  PSYCH: normal behavior         LABS:                          13.4   8.97  )-----------( 239      ( 29 Oct 2024 06:41 )             41.3     29 Oct 2024 06:41    144    |  110    |  17     ----------------------------<  99     4.4     |  33     |  0.71     Ca    9.2        29 Oct 2024 06:41  Mg     2.2       28 Oct 2024 15:54    TPro  7.6    /  Alb  3.7    /  TBili  0.2    /  DBili  x      /  AST  22     /  ALT  34     /  AlkPhos  86     28 Oct 2024 15:54    LIVER FUNCTIONS - ( 28 Oct 2024 15:54 )  Alb: 3.7 g/dL / Pro: 7.6 gm/dL / ALK PHOS: 86 U/L / ALT: 34 U/L / AST: 22 U/L / GGT: x           PT/INR - ( 28 Oct 2024 15:54 )   PT: 10.1 sec;   INR: 0.88 ratio         PTT - ( 28 Oct 2024 15:54 )  PTT:30.4 sec  CAPILLARY BLOOD GLUCOSE            Urinalysis Basic - ( 29 Oct 2024 06:41 )    Color: x / Appearance: x / SG: x / pH: x  Gluc: 99 mg/dL / Ketone: x  / Bili: x / Urobili: x   Blood: x / Protein: x / Nitrite: x   Leuk Esterase: x / RBC: x / WBC x   Sq Epi: x / Non Sq Epi: x / Bacteria: x        RADIOLOGY:

## 2024-10-29 NOTE — ASU PATIENT PROFILE, ADULT - PRO ARRIVE FROM
Have You Had This Injection Before?: has not been previously injected Additional History: Patient in office today for first injection of Dupixent. home

## 2024-10-29 NOTE — DISCHARGE NOTE PROVIDER - ATTENDING DISCHARGE PHYSICAL EXAMINATION:
Vital Signs Last 24 Hrs  T(C): 36.1 (29 Oct 2024 15:00), Max: 37.6 (29 Oct 2024 06:33)  T(F): 97 (29 Oct 2024 15:00), Max: 99.6 (29 Oct 2024 06:33)  HR: 78 (29 Oct 2024 15:50) (70 - 95)  BP: 102/64 (29 Oct 2024 15:35) (92/62 - 124/63)  BP(mean): 83 (29 Oct 2024 11:06) (70 - 85)  RR: 17 (29 Oct 2024 15:50) (16 - 18)  SpO2: 99% (29 Oct 2024 15:50) (95% - 100%)    Parameters below as of 29 Oct 2024 15:50  Patient On (Oxygen Delivery Method): room air    PHYSICAL EXAM:  GENERAL: NAD, lying in bed comfortably  HEAD:  Atraumatic, Normocephalic  EYES: conjunctiva and sclera clear  ENT: Moist mucous membranes  NECK: Supple, No JVD  CHEST/LUNG: Clear to auscultation bilaterally; No rales, rhonchi, wheezing. Unlabored respirations  HEART: Regular rate and rhythm; No murmurs  ABDOMEN: Bowel sounds present; Soft, Nontender, Nondistended.   EXTREMITIES:  2+ Peripheral Pulses, brisk capillary refill. No clubbing, cyanosis, or edema  NERVOUS SYSTEM:  Alert & Oriented X3, speech clear. No deficits   MSK: FROM all 4 extremities, full and equal strength

## 2024-10-29 NOTE — PROGRESS NOTE ADULT - SUBJECTIVE AND OBJECTIVE BOX
Department of Cardiology                                                               Division of Interventional Cardiology                                                               [facility] /[address] [address]                                                                                 [phone]                                                                                                                          Interventional Cardiology Nurse Practitioner Progress  Note    Patient is a 59y old  Female who presents with a chief complaint of Chest pain. (29 Oct 2024 10:18)    HPI:  Chief Complaint: chest pain.    The patient is a 59y year old Female with significant PMH of HTN and HLD presented to the ED with c/o CP x 2-4 week, but got worse today, so came to the ED for evaluation. Pt has an appt with her cardiologist in 1 week. Pt also c/o HA.  She has strong family history of early cardiac event (father with triple bypass at age 38), and she quit smoking just few months ago.  Initally her chest pain was on left side which has moved to her substernal area, was 11/10, and now 3/10 in intensity, associated with on and off sob, no aggravating or alleviating factors.  Sge denies palpitation, N/V, abdominal pain, diarrhea or dysuria.   Sig labs: WBC 11.22 otherwise unremarkable.  D-dimer normal.  Troponin x 2 negative. Mg, TSH and Pro-BNP all normal.  EK bpm in NSR. No acute ST-T changes.   CXR negative for any acute process.    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 There are blood products in multiple stages of evolution. There is no vasogenic edema to suggest fresh hemorrhage.     mg po given in ED.   (28 Oct 2024 20:20)    ASA class: II  Creatinine: 0.71  GFR: 98  Bleeding  Risk score: 1.1  Herminio Score:  1    -plan for cardiac cath for ischemic work up  - OLESYA protocol pre hydration: NS 250cc IV bolus x1   -Consent obtained for cardiac catheterization w/ coronary angiogram and possible stent placement, with possible sedation and analgia. Pt is competent, has capacity, and understands risks and benefits of procedure. Risks and benefits discussed. Risks include but not limited to bleeding, infection, allergy, renal failure requiring dialysis, stroke, and vascular injury.  All questions answered         s/p LHC :        Vital Signs Last 24 Hrs  T(C): 36.9 (29 Oct 2024 12:42), Max: 37.6 (29 Oct 2024 06:33)  T(F): 98.5 (29 Oct 2024 12:42), Max: 99.6 (29 Oct 2024 06:33)  HR: 86 (29 Oct 2024 12:44) (70 - 103)  BP: 124/63 (29 Oct 2024 12:44) (92/62 - 134/83)  BP(mean): 83 (29 Oct 2024 11:06) (70 - 98)  RR: 16 (29 Oct 2024 12:44) (16 - 18)  SpO2: 97% (29 Oct 2024 12:44) (95% - 100%)    Parameters below as of 29 Oct 2024 12:44  Patient On (Oxygen Delivery Method): room air        PHYSICAL EXAM:  NEURO: Non-focal, AxOx3.  No neuro deficits NECK: Supple, No JVD, No LAD  CHEST/LUNG: Clear to auscultation bilaterally; No wheeze  HEART: s1 s2 Regular rate and rhythm; No murmurs, rubs, or gallops  ABDOMEN: Soft, Nontender, Nondistended; Bowel sounds present X 4 quadrants   EXTREMITIES:  2+ Peripheral Pulses, No clubbing, cyanosis, or edema   VASCULAR: Peripheral pulses palpable 2+ bilaterally  PROCEDURE SITE: ----- ,Site is without hematoma or bleeding. Sensation and ENDY intact. Distal pulses palpable 2+, capillary refill < 2 seconds. Patient denies pain, numbness, tingling, CP or SOB. Clean dry dressing applied    Labs:                        13.4   8.97  )-----------( 239      ( 29 Oct 2024 06:41 )             41.3     10-    144  |  110[H]  |  17  ----------------------------<  99  4.4   |  33[H]  |  0.71    Ca    9.2      29 Oct 2024 06:41  Mg     2.2     10-28    TPro  7.6  /  Alb  3.7  /  TBili  0.2  /  DBili  x   /  AST  22  /  ALT  34  /  AlkPhos  86  10-28    PT/INR - ( 28 Oct 2024 15:54 )   PT: 10.1 sec;   INR: 0.88 ratio         PTT - ( 28 Oct 2024 15:54 )  PTT:30.4 sec        MEDICATIONS  (STANDING):  aspirin enteric coated 81 milliGRAM(s) Oral daily  hydrochlorothiazide 12.5 milliGRAM(s) Oral daily  influenza   Vaccine 0.5 milliLiter(s) IntraMuscular once  lisinopril 40 milliGRAM(s) Oral daily  rosuvastatin 5 milliGRAM(s) Oral at bedtime      RADIOLOGY           - s/p LHC   -VS, diet, activity as per post cath orders  -Encourage PO fluids  -Continue current medications  -hold metformin for 48 hrs post cath  -Post cath instructions reviewed, post sedation instructions reviewed; patient verbalizes and understands instructions  -Discussed therapeutic lifestyle changes to reduce risk factors such as following a cardiac diet, weight loss, maintaining a healthy weight, exercise, smoking cessation, medication compliance, and regular follow-up  with PCP/Cardioloigst  - stent card given to patient/family upon discharge from hospital by discharging RN  -Qualified for cardiac rehab. see cardiac rehab note  -Plan of care discussed with patient, RN and  ----    Department of Cardiology                                                               Division of Interventional Cardiology                                                               [facility] /[address] [address]                                                                                 [phone]                                                                                                                          Interventional Cardiology Nurse Practitioner Progress  Note    Patient is a 59y old  Female who presents with a chief complaint of Chest pain. (29 Oct 2024 10:18)    HPI:  Chief Complaint: chest pain.    The patient is a 59y year old Female with significant PMH of HTN and HLD presented to the ED with c/o CP x 2-4 week, but got worse today, so came to the ED for evaluation. Pt has an appt with her cardiologist in 1 week. Pt also c/o HA.  She has strong family history of early cardiac event (father with triple bypass at age 38), and she quit smoking just few months ago.  Initally her chest pain was on left side which has moved to her substernal area, was 11/10, and now 3/10 in intensity, associated with on and off sob, no aggravating or alleviating factors.  Sge denies palpitation, N/V, abdominal pain, diarrhea or dysuria.   Sig labs: WBC 11.22 otherwise unremarkable.  D-dimer normal.  Troponin x 2 negative. Mg, TSH and Pro-BNP all normal.  EK bpm in NSR. No acute ST-T changes.   CXR negative for any acute process.    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 There are blood products in multiple stages of evolution. There is no vasogenic edema to suggest fresh hemorrhage.     mg po given in ED.   (28 Oct 2024 20:20)    ASA class: II  Creatinine: 0.71  GFR: 98  Bleeding  Risk score: 1.1  Herminio Score:  1    -plan for cardiac cath for ischemic work up  - OLESYA protocol pre hydration: NS 250cc IV bolus x1   -Consent obtained for cardiac catheterization w/ coronary angiogram and possible stent placement, with possible sedation and analgia. Pt is competent, has capacity, and understands risks and benefits of procedure. Risks and benefits discussed. Risks include but not limited to bleeding, infection, allergy, renal failure requiring dialysis, stroke, and vascular injury.  All questions answered         s/p LHC : revealing normal cors   Pt denies chest pain/SOB/palpitations post cath.  PROCEDURE SITE: --RRA hemoband to be removed 1 hoour post placement.  Site is without hematoma or bleeding. Sensation and ENDY intact. Distal pulses palpable 2+, capillary refill < 2 seconds.         Vital Signs Last 24 Hrs  T(C): 36.9 (29 Oct 2024 12:42), Max: 37.6 (29 Oct 2024 06:33)  T(F): 98.5 (29 Oct 2024 12:42), Max: 99.6 (29 Oct 2024 06:33)  HR: 86 (29 Oct 2024 12:44) (70 - 103)  BP: 124/63 (29 Oct 2024 12:44) (92/62 - 134/83)  BP(mean): 83 (29 Oct 2024 11:06) (70 - 98)  RR: 16 (29 Oct 2024 12:44) (16 - 18)  SpO2: 97% (29 Oct 2024 12:44) (95% - 100%)    Parameters below as of 29 Oct 2024 12:44  Patient On (Oxygen Delivery Method): room air        PHYSICAL EXAM:  NEURO: Non-focal, AxOx3.  No neuro deficits NECK: Supple, No JVD, No LAD  CHEST/LUNG: Clear to auscultation bilaterally; No wheeze  HEART: s1 s2 Regular rate and rhythm; No murmurs, rubs, or gallops  ABDOMEN: Soft, Nontender, Nondistended; Bowel sounds present X 4 quadrants   EXTREMITIES:  2+ Peripheral Pulses, No clubbing, cyanosis, or edema   VASCULAR: Peripheral pulses palpable 2+ bilaterally      Labs:                        13.4   8.97  )-----------( 239      ( 29 Oct 2024 06:41 )             41.3     10-    144  |  110[H]  |  17  ----------------------------<  99  4.4   |  33[H]  |  0.71    Ca    9.2      29 Oct 2024 06:41  Mg     2.2     10-    TPro  7.6  /  Alb  3.7  /  TBili  0.2  /  DBili  x   /  AST  22  /  ALT  34  /  AlkPhos  86  10-28    PT/INR - ( 28 Oct 2024 15:54 )   PT: 10.1 sec;   INR: 0.88 ratio         PTT - ( 28 Oct 2024 15:54 )  PTT:30.4 sec        MEDICATIONS  (STANDING):  aspirin enteric coated 81 milliGRAM(s) Oral daily  hydrochlorothiazide 12.5 milliGRAM(s) Oral daily  influenza   Vaccine 0.5 milliLiter(s) IntraMuscular once  lisinopril 40 milliGRAM(s) Oral daily  rosuvastatin 5 milliGRAM(s) Oral at bedtime      RADIOLOGY   full report to follow         - s/p [facility] revealing normal cors   -VS, diet, activity as per post cath orders  -Encourage PO fluids  -Continue current medications  -Post cath instructions reviewed, post sedation instructions reviewed; patient verbalizes and understands instructions  -Discussed therapeutic lifestyle changes to reduce risk factors such as following a cardiac diet, weight loss, maintaining a healthy weight, exercise, smoking cessation, medication compliance, and regular follow-up  with PCP/Cardioloigst Dr. Cassidy   -Plan of care discussed with patient, RN and Dr. Gomez  - optimized for discharge per IC standpoint   - d/w Dr. Hudson

## 2024-10-29 NOTE — DISCHARGE NOTE NURSING/CASE MANAGEMENT/SOCIAL WORK - FINANCIAL ASSISTANCE
[facility] provides services at a reduced cost to those who are determined to be eligible through [facility]’s financial assistance program. Information regarding [facility]’s financial assistance program can be found by going to [email or web address] or by calling [phone].

## 2024-10-29 NOTE — DISCHARGE NOTE PROVIDER - NSDCMRMEDTOKEN_GEN_ALL_CORE_FT
aspirin 81 mg oral delayed release tablet: 1 tab(s) orally once a day  hydroCHLOROthiazide 12.5 mg oral tablet: 1 tab(s) orally once a day  lisinopril 40 mg oral tablet: 1 tab(s) orally once a day  rosuvastatin: 5 milligram(s) orally once a day (at bedtime)

## 2024-10-29 NOTE — DISCHARGE NOTE PROVIDER - CARE PROVIDER_API CALL
Wood Cassidy  Cardiology  [address] [place] [address]  Phone: [phone]  Fax: [phone]  Established Patient  Scheduled Appointment: 11/04/2024

## 2024-10-29 NOTE — PACU DISCHARGE NOTE - COMMENTS
pt VSS. Right Radial tegaderm dressing C/D/I. no s/s of bleeding or hematoma noted. no c/o of CP or discomfort. cardiac caath discharge instructions reviewed with patient in cath lab with teachback performed. transported back to ED on monitor to [facility] 4 for hospital discharge.

## 2024-10-29 NOTE — DISCHARGE NOTE PROVIDER - DISCHARGING ATTENDING PHYSICIAN:
Patient presents to clinic today for several warts she would like frozen.     No LMP recorded.  Medication Reconciliation: complete    Abida Hermosillo LPN  3/29/2019 8:45 AM     M Tristan

## 2024-11-06 DIAGNOSIS — I10 ESSENTIAL (PRIMARY) HYPERTENSION: ICD-10-CM

## 2024-11-06 DIAGNOSIS — Z82.49 FAMILY HISTORY OF ISCHEMIC HEART DISEASE AND OTHER DISEASES OF THE CIRCULATORY SYSTEM: ICD-10-CM

## 2024-11-06 DIAGNOSIS — F17.210 NICOTINE DEPENDENCE, CIGARETTES, UNCOMPLICATED: ICD-10-CM

## 2024-11-06 DIAGNOSIS — E78.5 HYPERLIPIDEMIA, UNSPECIFIED: ICD-10-CM
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
## 2024-11-06 DIAGNOSIS — R07.9 CHEST PAIN, UNSPECIFIED: ICD-10-CM
